# Patient Record
Sex: FEMALE | Race: BLACK OR AFRICAN AMERICAN | NOT HISPANIC OR LATINO | Employment: FULL TIME | URBAN - METROPOLITAN AREA
[De-identification: names, ages, dates, MRNs, and addresses within clinical notes are randomized per-mention and may not be internally consistent; named-entity substitution may affect disease eponyms.]

---

## 2017-04-17 DIAGNOSIS — J45.40: ICD-10-CM

## 2017-04-17 RX ORDER — BUDESONIDE AND FORMOTEROL FUMARATE DIHYDRATE 80; 4.5 UG/1; UG/1
2 AEROSOL RESPIRATORY (INHALATION) 2 TIMES DAILY
Qty: 10.2 INHALER | Refills: 4 | OUTPATIENT
Start: 2017-04-17 | End: 2018-04-17

## 2017-05-31 ENCOUNTER — LAB VISIT (OUTPATIENT)
Dept: LAB | Facility: OTHER | Age: 32
End: 2017-05-31
Attending: FAMILY MEDICINE
Payer: COMMERCIAL

## 2017-05-31 ENCOUNTER — TELEPHONE (OUTPATIENT)
Dept: INTERNAL MEDICINE | Facility: CLINIC | Age: 32
End: 2017-05-31

## 2017-05-31 ENCOUNTER — OFFICE VISIT (OUTPATIENT)
Dept: INTERNAL MEDICINE | Facility: CLINIC | Age: 32
End: 2017-05-31
Attending: FAMILY MEDICINE
Payer: COMMERCIAL

## 2017-05-31 VITALS
HEIGHT: 68 IN | BODY MASS INDEX: 39.59 KG/M2 | DIASTOLIC BLOOD PRESSURE: 80 MMHG | HEART RATE: 97 BPM | WEIGHT: 261.25 LBS | SYSTOLIC BLOOD PRESSURE: 120 MMHG

## 2017-05-31 DIAGNOSIS — L23.9 ECZEMA, ALLERGIC: ICD-10-CM

## 2017-05-31 DIAGNOSIS — Z00.00 ANNUAL PHYSICAL EXAM: Primary | ICD-10-CM

## 2017-05-31 DIAGNOSIS — F32.A DEPRESSION, UNSPECIFIED DEPRESSION TYPE: ICD-10-CM

## 2017-05-31 DIAGNOSIS — F41.9 ANXIETY: ICD-10-CM

## 2017-05-31 DIAGNOSIS — J45.30 ASTHMA, ALLERGIC, MILD PERSISTENT, UNCOMPLICATED: ICD-10-CM

## 2017-05-31 DIAGNOSIS — Z00.00 ANNUAL PHYSICAL EXAM: ICD-10-CM

## 2017-05-31 PROBLEM — J45.909 EXTRINSIC ASTHMA WITHOUT COMPLICATION: Status: ACTIVE | Noted: 2017-05-31

## 2017-05-31 LAB
ALBUMIN SERPL BCP-MCNC: 3.7 G/DL
ALP SERPL-CCNC: 67 U/L
ALT SERPL W/O P-5'-P-CCNC: 12 U/L
ANION GAP SERPL CALC-SCNC: 7 MMOL/L
AST SERPL-CCNC: 17 U/L
BASOPHILS # BLD AUTO: 0.05 K/UL
BASOPHILS NFR BLD: 0.7 %
BILIRUB SERPL-MCNC: 0.3 MG/DL
BUN SERPL-MCNC: 12 MG/DL
CALCIUM SERPL-MCNC: 9.6 MG/DL
CHLORIDE SERPL-SCNC: 106 MMOL/L
CHOLEST/HDLC SERPL: 3.6 {RATIO}
CO2 SERPL-SCNC: 25 MMOL/L
CREAT SERPL-MCNC: 0.8 MG/DL
DIFFERENTIAL METHOD: ABNORMAL
EOSINOPHIL # BLD AUTO: 0.3 K/UL
EOSINOPHIL NFR BLD: 4.8 %
ERYTHROCYTE [DISTWIDTH] IN BLOOD BY AUTOMATED COUNT: 15.4 %
EST. GFR  (AFRICAN AMERICAN): >60 ML/MIN/1.73 M^2
EST. GFR  (NON AFRICAN AMERICAN): >60 ML/MIN/1.73 M^2
GLUCOSE SERPL-MCNC: 81 MG/DL
HCT VFR BLD AUTO: 38.6 %
HDL/CHOLESTEROL RATIO: 27.6 %
HDLC SERPL-MCNC: 181 MG/DL
HDLC SERPL-MCNC: 50 MG/DL
HGB BLD-MCNC: 12.4 G/DL
LDLC SERPL CALC-MCNC: 117.8 MG/DL
LYMPHOCYTES # BLD AUTO: 1.9 K/UL
LYMPHOCYTES NFR BLD: 28.8 %
MCH RBC QN AUTO: 25.8 PG
MCHC RBC AUTO-ENTMCNC: 32.1 %
MCV RBC AUTO: 80 FL
MONOCYTES # BLD AUTO: 0.5 K/UL
MONOCYTES NFR BLD: 7 %
NEUTROPHILS # BLD AUTO: 3.9 K/UL
NEUTROPHILS NFR BLD: 58.6 %
NONHDLC SERPL-MCNC: 131 MG/DL
PLATELET # BLD AUTO: 400 K/UL
PMV BLD AUTO: 10.4 FL
POTASSIUM SERPL-SCNC: 4.1 MMOL/L
PROT SERPL-MCNC: 8 G/DL
RBC # BLD AUTO: 4.8 M/UL
SODIUM SERPL-SCNC: 138 MMOL/L
TRIGL SERPL-MCNC: 66 MG/DL
TSH SERPL DL<=0.005 MIU/L-ACNC: 1.41 UIU/ML
WBC # BLD AUTO: 6.7 K/UL

## 2017-05-31 PROCEDURE — 85025 COMPLETE CBC W/AUTO DIFF WBC: CPT

## 2017-05-31 PROCEDURE — 84443 ASSAY THYROID STIM HORMONE: CPT

## 2017-05-31 PROCEDURE — 80061 LIPID PANEL: CPT

## 2017-05-31 PROCEDURE — 99395 PREV VISIT EST AGE 18-39: CPT | Mod: S$GLB,,, | Performed by: FAMILY MEDICINE

## 2017-05-31 PROCEDURE — 80053 COMPREHEN METABOLIC PANEL: CPT

## 2017-05-31 PROCEDURE — 36415 COLL VENOUS BLD VENIPUNCTURE: CPT

## 2017-05-31 PROCEDURE — 99999 PR PBB SHADOW E&M-EST. PATIENT-LVL III: CPT | Mod: PBBFAC,,, | Performed by: FAMILY MEDICINE

## 2017-05-31 RX ORDER — BUDESONIDE AND FORMOTEROL FUMARATE DIHYDRATE 80; 4.5 UG/1; UG/1
2 AEROSOL RESPIRATORY (INHALATION) 2 TIMES DAILY
Qty: 10.2 INHALER | Refills: 4 | Status: SHIPPED | OUTPATIENT
Start: 2017-05-31 | End: 2017-05-31 | Stop reason: SDUPTHER

## 2017-05-31 RX ORDER — BUPROPION HYDROCHLORIDE 150 MG/1
TABLET ORAL
Qty: 30 TABLET | Refills: 1 | Status: SHIPPED | OUTPATIENT
Start: 2017-05-31 | End: 2018-01-09 | Stop reason: SDUPTHER

## 2017-05-31 RX ORDER — ALBUTEROL SULFATE 90 UG/1
2 AEROSOL, METERED RESPIRATORY (INHALATION) EVERY 6 HOURS PRN
Qty: 18 G | Refills: 5 | Status: SHIPPED | OUTPATIENT
Start: 2017-05-31 | End: 2017-05-31 | Stop reason: SDUPTHER

## 2017-05-31 RX ORDER — BUDESONIDE AND FORMOTEROL FUMARATE DIHYDRATE 80; 4.5 UG/1; UG/1
2 AEROSOL RESPIRATORY (INHALATION) 2 TIMES DAILY
Qty: 10.2 INHALER | Refills: 4 | Status: SHIPPED | OUTPATIENT
Start: 2017-05-31 | End: 2018-01-09

## 2017-05-31 RX ORDER — ALBUTEROL SULFATE 90 UG/1
2 AEROSOL, METERED RESPIRATORY (INHALATION) EVERY 6 HOURS PRN
Qty: 18 G | Refills: 5 | Status: SHIPPED | OUTPATIENT
Start: 2017-05-31 | End: 2018-01-09 | Stop reason: SDUPTHER

## 2017-05-31 NOTE — TELEPHONE ENCOUNTER
Spoke with the pt in regards to the message left . I was advising her that she can have her rx pulled from one store to another when the pt patient said she she was having a painc attack because her inhaler was over $200 and for me to address who I was and I said who I said who I was again and then she and the pt hung up. This message was relied to Dr. Lantigua.

## 2017-05-31 NOTE — TELEPHONE ENCOUNTER
----- Message from [place] sent at 5/31/2017  3:33 PM CDT -----  Contact: PAULINE HARDING [[id number]]  X_  1st Request  _  2nd Request  _  3rd Request        Who: PAULINE HARDING [[id number]]    Why: Patient called and said that her medication was called into the wrong pharmacy. Patient would like it to be sent [facility] [place] - [address] Please call back to follow up.    What Number to Call Back: [phone]    When to Expect a call back: (Before the end of the day)   -- if the call is after 12:00, the call back will be tomorrow.

## 2017-06-06 PROBLEM — L23.9 ECZEMA, ALLERGIC: Status: ACTIVE | Noted: 2017-06-06

## 2017-06-06 NOTE — PROGRESS NOTES
Subjective:       Patient ID: Yumiko Valerio is a 31 y.o. female.    Chief Complaint: Rash (rash on both feet, no pain just intching)    Pt presents today for annual exam. Pt also wants to know about family planning next year and wants to meet with a GYN prior. Pt also states that she has a rash on both feet but has an extensive h/o eczma as a child. States that asthma is not well controlled and is needing her alb inh more. Does not have daily maintanence med at this time        Rash   Pertinent negatives include no congestion, cough, diarrhea, eye pain, fatigue, fever, rhinorrhea, shortness of breath, sore throat or vomiting.     Review of Systems   Constitutional: Negative for activity change, appetite change, chills, fatigue, fever and unexpected weight change.   HENT: Negative for congestion, ear pain, hearing loss, rhinorrhea, sinus pressure, sore throat and trouble swallowing.    Eyes: Positive for discharge and visual disturbance. Negative for photophobia and pain.   Respiratory: Positive for chest tightness and wheezing. Negative for apnea, cough and shortness of breath.    Cardiovascular: Positive for palpitations. Negative for chest pain and leg swelling.   Gastrointestinal: Negative for abdominal distention, abdominal pain, blood in stool, constipation, diarrhea, nausea and vomiting.   Endocrine: Negative for polydipsia and polyuria.   Genitourinary: Negative.  Negative for difficulty urinating, dysuria, hematuria and menstrual problem.   Musculoskeletal: Negative for arthralgias, back pain, joint swelling and neck pain.   Skin: Positive for rash.   Neurological: Negative for dizziness, tremors, weakness, numbness and headaches.   Psychiatric/Behavioral: Positive for dysphoric mood. Negative for behavioral problems, confusion, decreased concentration, self-injury, sleep disturbance and suicidal ideas. The patient is not nervous/anxious.        Objective:      Physical Exam   Constitutional: She is  oriented to person, place, and time. She appears well-developed and well-nourished.   HENT:   Head: Normocephalic and atraumatic.   Right Ear: External ear normal.   Left Ear: External ear normal.   Nose: Nose normal.   Mouth/Throat: Oropharynx is clear and moist. No oropharyngeal exudate.   Eyes: EOM are normal. Pupils are equal, round, and reactive to light.   Neck: Normal range of motion. Neck supple. No thyromegaly present.   Cardiovascular: Normal rate, regular rhythm, normal heart sounds and intact distal pulses.    No murmur heard.  Pulmonary/Chest: Effort normal and breath sounds normal. No respiratory distress.   Abdominal: Soft. Bowel sounds are normal. She exhibits no distension and no mass. There is no tenderness. There is no rebound and no guarding.   Musculoskeletal: Normal range of motion. She exhibits no edema or tenderness.   Lymphadenopathy:     She has no cervical adenopathy.   Neurological: She is alert and oriented to person, place, and time. She has normal reflexes.   Skin: Skin is warm and dry. There is erythema (pos rash noted on b/l hands and feet, flexor surfaces indicative of eczema).   Psychiatric: She has a normal mood and affect. Her behavior is normal. Judgment and thought content normal.       Assessment:       1. Annual physical exam    2. Asthma, allergic, mild persistent, uncomplicated    3. Anxiety    4. Depression, unspecified depression type        Plan:       Would suggest baseline LABA for pt along corticosteroid. Explained that i do not know which is covered so pt will have to see if the one I order is not to call back w name of one that is. Pt amenable  Anxiety: pt will think about starting a long acting medicine for this  But if so will prefer wellbutrin which has worked well in past. Declines short acting at this time as well  Seasonal allergies that are elading to eczema and asthma. Steroid ointment for eczema   referred to GYN for family counseling, pt with female  partner and wants some understanding of this  Annual physical exam  -     Lipid panel; Future; Expected date: 05/31/2017  -     TSH; Future; Expected date: 05/31/2017  -     Comprehensive metabolic panel; Future; Expected date: 05/31/2017  -     CBC auto differential; Future; Expected date: 05/31/2017    Asthma, allergic, mild persistent, uncomplicated  -     Discontinue: albuterol 90 mcg/actuation inhaler; Inhale 2 puffs into the lungs every 6 (six) hours as needed for Wheezing.  Dispense: 18 g; Refill: 5  -     Discontinue: budesonide-formoterol 80-4.5 mcg (SYMBICORT) 80-4.5 mcg/actuation HFAA; Inhale 2 puffs into the lungs 2 (two) times daily.  Dispense: 10.2 Inhaler; Refill: 4  -     budesonide-formoterol 80-4.5 mcg (SYMBICORT) 80-4.5 mcg/actuation HFAA; Inhale 2 puffs into the lungs 2 (two) times daily.  Dispense: 10.2 Inhaler; Refill: 4  -     albuterol 90 mcg/actuation inhaler; Inhale 2 puffs into the lungs every 6 (six) hours as needed for Wheezing.  Dispense: 18 g; Refill: 5    Anxiety    Depression, unspecified depression type  -     buPROPion (WELLBUTRIN XL) 150 MG TB24 tablet; TAKE 1 TABLET(150 MG) BY MOUTH EVERY DAY  Dispense: 30 tablet; Refill: 1    Other orders  -     Cancel: VITAMIN D; Future; Expected date: 05/31/2017      Labs pending. RTC prn symptoms or 4 weeks for wellbutrin assessment

## 2018-01-09 ENCOUNTER — OFFICE VISIT (OUTPATIENT)
Dept: INTERNAL MEDICINE | Facility: CLINIC | Age: 33
End: 2018-01-09
Attending: FAMILY MEDICINE
Payer: COMMERCIAL

## 2018-01-09 VITALS
WEIGHT: 256.38 LBS | HEART RATE: 64 BPM | SYSTOLIC BLOOD PRESSURE: 134 MMHG | OXYGEN SATURATION: 99 % | DIASTOLIC BLOOD PRESSURE: 92 MMHG | BODY MASS INDEX: 38.98 KG/M2

## 2018-01-09 DIAGNOSIS — J45.30 ASTHMA, ALLERGIC, MILD PERSISTENT, UNCOMPLICATED: ICD-10-CM

## 2018-01-09 DIAGNOSIS — F17.200 SMOKER: ICD-10-CM

## 2018-01-09 DIAGNOSIS — R21 RASH: ICD-10-CM

## 2018-01-09 DIAGNOSIS — F32.A DEPRESSION, UNSPECIFIED DEPRESSION TYPE: Primary | ICD-10-CM

## 2018-01-09 PROCEDURE — 99214 OFFICE O/P EST MOD 30 MIN: CPT | Mod: S$GLB,,, | Performed by: FAMILY MEDICINE

## 2018-01-09 PROCEDURE — 99999 PR PBB SHADOW E&M-EST. PATIENT-LVL III: CPT | Mod: PBBFAC,,, | Performed by: FAMILY MEDICINE

## 2018-01-09 RX ORDER — BUPROPION HYDROCHLORIDE 150 MG/1
TABLET ORAL
Qty: 90 TABLET | Refills: 0 | Status: SHIPPED | OUTPATIENT
Start: 2018-01-09 | End: 2018-01-09 | Stop reason: SDUPTHER

## 2018-01-09 RX ORDER — BUPROPION HYDROCHLORIDE 150 MG/1
TABLET ORAL
Qty: 90 TABLET | Refills: 0 | Status: SHIPPED | OUTPATIENT
Start: 2018-01-09 | End: 2018-01-09

## 2018-01-09 RX ORDER — BUDESONIDE AND FORMOTEROL FUMARATE DIHYDRATE 160; 4.5 UG/1; UG/1
2 AEROSOL RESPIRATORY (INHALATION) EVERY 12 HOURS
Qty: 1 INHALER | Refills: 5 | Status: SHIPPED | OUTPATIENT
Start: 2018-01-09 | End: 2018-06-01 | Stop reason: SDUPTHER

## 2018-01-09 RX ORDER — BUPROPION HYDROCHLORIDE 300 MG/1
300 TABLET ORAL DAILY
Qty: 90 TABLET | Refills: 1 | Status: SHIPPED | OUTPATIENT
Start: 2018-01-09 | End: 2018-07-02 | Stop reason: SDUPTHER

## 2018-01-09 RX ORDER — ALBUTEROL SULFATE 90 UG/1
2 AEROSOL, METERED RESPIRATORY (INHALATION) EVERY 6 HOURS PRN
Qty: 18 G | Refills: 5 | Status: SHIPPED | OUTPATIENT
Start: 2018-01-09 | End: 2019-04-15 | Stop reason: SDUPTHER

## 2018-01-09 NOTE — PROGRESS NOTES
Subjective:       Patient ID: Yumiko Valerio is a 32 y.o. female.    Chief Complaint: Asthma; Nicotine Dependence (wants to discuss quiting); and Rash (bilateral foot ref derm)    Pt presents today for referral to GYN for persistent rash on both feet but has an extensive h/o eczma as a child. States that asthma is not well controlled and is needing her alb inh more. Does have daily med but on lower dosing at this time  Pt also wants to quit smoking, willing to look into smoking cessation as well as resuming wellbutrin  Pt also with anxiety and would like a med for this as well        Rash   Pertinent negatives include no congestion, cough, diarrhea, eye pain, fatigue, fever, rhinorrhea, shortness of breath, sore throat or vomiting. Her past medical history is significant for asthma.   Asthma   She complains of wheezing. There is no cough or shortness of breath. Pertinent negatives include no appetite change, chest pain, ear pain, fever, headaches, rhinorrhea, sore throat or trouble swallowing. Her past medical history is significant for asthma.   Nicotine Dependence   Symptoms are negative for decreased concentration, fatigue and sore throat. Her urge triggers include company of smokers.     Review of Systems   Constitutional: Negative for activity change, appetite change, chills, fatigue, fever and unexpected weight change.   HENT: Negative for congestion, ear pain, hearing loss, rhinorrhea, sinus pressure, sore throat and trouble swallowing.    Eyes: Positive for discharge and visual disturbance. Negative for photophobia and pain.   Respiratory: Positive for chest tightness and wheezing. Negative for apnea, cough and shortness of breath.    Cardiovascular: Positive for palpitations. Negative for chest pain and leg swelling.   Gastrointestinal: Negative for abdominal distention, abdominal pain, blood in stool, constipation, diarrhea, nausea and vomiting.   Endocrine: Negative for polydipsia and polyuria.    Genitourinary: Negative.  Negative for difficulty urinating, dysuria, hematuria and menstrual problem.   Musculoskeletal: Negative for arthralgias, back pain, joint swelling and neck pain.   Skin: Positive for rash.   Neurological: Negative for dizziness, tremors, weakness, numbness and headaches.   Psychiatric/Behavioral: Positive for dysphoric mood. Negative for behavioral problems, confusion, decreased concentration, self-injury, sleep disturbance and suicidal ideas. The patient is nervous/anxious.        Objective:      Physical Exam   Constitutional: She is oriented to person, place, and time. She appears well-developed and well-nourished.   HENT:   Head: Normocephalic and atraumatic.   Right Ear: External ear normal.   Left Ear: External ear normal.   Nose: Nose normal.   Mouth/Throat: Oropharynx is clear and moist. No oropharyngeal exudate.   Eyes: EOM are normal. Pupils are equal, round, and reactive to light.   Neck: Normal range of motion. Neck supple. No thyromegaly present.   Cardiovascular: Normal rate, regular rhythm, normal heart sounds and intact distal pulses.    No murmur heard.  Pulmonary/Chest: Effort normal and breath sounds normal. No respiratory distress.   Abdominal: Soft. Bowel sounds are normal. She exhibits no distension and no mass. There is no tenderness. There is no rebound and no guarding.   Musculoskeletal: Normal range of motion. She exhibits no edema or tenderness.   Lymphadenopathy:     She has no cervical adenopathy.   Neurological: She is alert and oriented to person, place, and time. She has normal reflexes.   Skin: Skin is warm and dry. Rash noted. There is erythema (pos rash noted on b/l hands and feet, flexor surfaces indicative of eczema).   Psychiatric: She has a normal mood and affect. Her behavior is normal. Judgment and thought content normal.       Assessment:       1. Rash    2. Smoker    3. Asthma, allergic, mild persistent, uncomplicated    4. Depression,  unspecified depression type        Plan:       Would suggest increasing her baseline LABA for pt along corticosteroid. Pt amenable  Anxiety:  prefer wellbutrin which has worked well in past. Declines short acting at this time as well. Resume wellbutrin but pt wants 300 mg dosing. SE of med d.w pt  Referral to derm for persistent rash     Rash  -     Ambulatory Referral to Dermatology    Smoker  -     Ambulatory referral to Smoking Cessation Program    Asthma, allergic, mild persistent, uncomplicated  -     albuterol 90 mcg/actuation inhaler; Inhale 2 puffs into the lungs every 6 (six) hours as needed for Wheezing.  Dispense: 18 g; Refill: 5    Depression, unspecified depression type  -     Discontinue: buPROPion (WELLBUTRIN XL) 150 MG TB24 tablet; TAKE 1 TABLET(150 MG) BY MOUTH EVERY DAY  Dispense: 90 tablet; Refill: 0  -     Discontinue: buPROPion (WELLBUTRIN XL) 150 MG TB24 tablet; TAKE 1 TABLET(150 MG) BY MOUTH EVERY DAY  Dispense: 90 tablet; Refill: 0    Other orders  -     budesonide-formoterol 160-4.5 mcg (SYMBICORT) 160-4.5 mcg/actuation HFAA; Inhale 2 puffs into the lungs every 12 (twelve) hours. Controller  Dispense: 1 Inhaler; Refill: 5  -     buPROPion (WELLBUTRIN XL) 300 MG 24 hr tablet; Take 1 tablet (300 mg total) by mouth once daily.  Dispense: 90 tablet; Refill: 1      Labs pending. RTC prn symptoms or 6 mos for wellbutrin assessment  SE's of med d/w pt denies any SI/HI

## 2018-01-10 ENCOUNTER — TELEPHONE (OUTPATIENT)
Dept: INTERNAL MEDICINE | Facility: CLINIC | Age: 33
End: 2018-01-10

## 2018-01-11 NOTE — TELEPHONE ENCOUNTER
Spoke with pt. Pt states she will ruddy derm appt via patient portal. Pt verbally understood and had no further questions.

## 2018-02-09 ENCOUNTER — OFFICE VISIT (OUTPATIENT)
Dept: OBSTETRICS AND GYNECOLOGY | Facility: CLINIC | Age: 33
End: 2018-02-09
Payer: COMMERCIAL

## 2018-02-09 VITALS
WEIGHT: 251.31 LBS | SYSTOLIC BLOOD PRESSURE: 120 MMHG | DIASTOLIC BLOOD PRESSURE: 72 MMHG | HEIGHT: 68 IN | BODY MASS INDEX: 38.09 KG/M2

## 2018-02-09 DIAGNOSIS — F41.9 ANXIETY: ICD-10-CM

## 2018-02-09 DIAGNOSIS — Z11.3 SCREEN FOR STD (SEXUALLY TRANSMITTED DISEASE): ICD-10-CM

## 2018-02-09 DIAGNOSIS — Z01.419 ENCOUNTER FOR GYNECOLOGICAL EXAMINATION WITHOUT ABNORMAL FINDING: Primary | ICD-10-CM

## 2018-02-09 LAB
C TRACH DNA SPEC QL NAA+PROBE: NOT DETECTED
CANDIDA RRNA VAG QL PROBE: NEGATIVE
G VAGINALIS RRNA GENITAL QL PROBE: NEGATIVE
N GONORRHOEA DNA SPEC QL NAA+PROBE: NOT DETECTED
T VAGINALIS RRNA GENITAL QL PROBE: NEGATIVE

## 2018-02-09 PROCEDURE — 99999 PR PBB SHADOW E&M-EST. PATIENT-LVL III: CPT | Mod: PBBFAC,,, | Performed by: OBSTETRICS & GYNECOLOGY

## 2018-02-09 PROCEDURE — 87480 CANDIDA DNA DIR PROBE: CPT

## 2018-02-09 PROCEDURE — 87491 CHLMYD TRACH DNA AMP PROBE: CPT

## 2018-02-09 PROCEDURE — 99395 PREV VISIT EST AGE 18-39: CPT | Mod: S$GLB,,, | Performed by: OBSTETRICS & GYNECOLOGY

## 2018-02-09 PROCEDURE — 88175 CYTOPATH C/V AUTO FLUID REDO: CPT

## 2018-02-19 NOTE — PROGRESS NOTES
HISTORY OF PRESENT ILLNESS:    Yumiko Valerio is a 32 y.o. female, , Patient's last menstrual period was 2018.,  presents for a routine exam and has no complaints.  Patient desires pregnancy in the future, seen with partner today. Normal monthly cycles.     Past Medical History:   Diagnosis Date    Allergy     Anemia     Asthma     Depression        Past Surgical History:   Procedure Laterality Date    WISDOM TOOTH EXTRACTION      WISDOM TOOTH EXTRACTION         MEDICATIONS AND ALLERGIES:      Current Outpatient Prescriptions:     albuterol 90 mcg/actuation inhaler, Inhale 2 puffs into the lungs every 6 (six) hours as needed for Wheezing., Disp: 18 g, Rfl: 5    budesonide-formoterol 160-4.5 mcg (SYMBICORT) 160-4.5 mcg/actuation HFAA, Inhale 2 puffs into the lungs every 12 (twelve) hours. Controller, Disp: 1 Inhaler, Rfl: 5    buPROPion (WELLBUTRIN XL) 300 MG 24 hr tablet, Take 1 tablet (300 mg total) by mouth once daily., Disp: 90 tablet, Rfl: 1    cyclobenzaprine (FLEXERIL) 5 MG tablet, Take 5 mg by mouth 3 (three) times daily as needed., Disp: , Rfl:     Review of patient's allergies indicates:   Allergen Reactions    Hay fever and allergy relief Shortness Of Breath     Watery and itchey eyes       Family History   Problem Relation Age of Onset    Breast cancer Maternal Aunt     Colon cancer Neg Hx     Ovarian cancer Neg Hx        Social History     Social History    Marital status:      Spouse name: N/A    Number of children: N/A    Years of education: N/A     Occupational History    Not on file.     Social History Main Topics    Smoking status: Former Smoker    Smokeless tobacco: Never Used    Alcohol use 1.2 oz/week     2 Glasses of wine per week    Drug use: No    Sexual activity: Yes     Partners: Female     Other Topics Concern    Not on file     Social History Narrative    No narrative on file       COMPREHENSIVE GYN HISTORY:  PAP History: Denies abnormal  "Paps.  Infection History: Denies STDs. Denies PID.  Benign History: Denies uterine fibroids. Denies ovarian cysts. Denies endometriosis. Denies other conditions.  Cancer History: Denies cervical cancer. Denies uterine cancer or hyperplasia. Denies ovarian cancer. Denies vulvar cancer or pre-cancer. Denies vaginal cancer or pre-cancer. Denies breast cancer. Denies colon cancer.  Sexual Activity History: Reports currently being sexually active  Menstrual History: Monthly. Mod then light flow.   Dysmenorrhea History: Reports mild dysmenorrhea.       ROS:  GENERAL: No weight changes. No swelling. No fatigue. No fever.  CARDIOVASCULAR: No chest pain. No shortness of breath. No leg cramps.   NEUROLOGICAL: No headaches. No vision changes.  BREASTS: No pain. No lumps. No discharge.  ABDOMEN: No pain. No nausea. No vomiting. No diarrhea. No constipation.  REPRODUCTIVE: No abnormal bleeding.   VULVA: No pain. No lesions. No itching.  VAGINA: No relaxation. No itching. No odor. No discharge. No lesions.  URINARY: No incontinence. No nocturia. No frequency. No dysuria.    /72   Ht 5' 8" (1.727 m)   Wt 114 kg (251 lb 5.2 oz)   LMP 02/01/2018   BMI 38.21 kg/m²     PE:  APPEARANCE: Well nourished, well developed, in no acute distress.  AFFECT: WNL, alert and oriented x 3.  SKIN: No acne or hirsutism.  NECK: Neck symmetric, without masses or thyromegaly.  NODES: No inguinal, cervical, axillary or femoral lymph node enlargement.  CHEST: Good respiratory effort.   ABDOMEN: Soft. No tenderness or masses. No hepatosplenomegaly. No hernias.  BREASTS: Symmetrical, no skin changes, visible lesions, palpable masses or nipple discharge bilaterally.  PELVIC: External female genitalia without lesions.  Female hair distribution. Adequate perineal body, Normal urethral meatus. Vagina moist and well rugated without lesions or discharge.  No significant cystocele or rectocele present. Cervix pink without lesions, discharge or " tenderness. Uterus is 4-6 week size, regular, mobile and nontender. Adnexa without masses or tenderness.  EXTREMITIES: No edema    DIAGNOSIS:  1. Encounter for gynecological examination without abnormal finding    2. Anxiety    3. Screen for STD (sexually transmitted disease)        PLAN:    Orders Placed This Encounter    C. trachomatis/N. gonorrhoeae by AMP DNA Cervix    Vaginosis Screen by DNA Probe    Liquid-based pap smear, screening       COUNSELING:  The patient was counseled today on:  -A.C.S. Pap and pelvic exam guidelines (pap every 3 years), recomendations for yearly mammogram;  -to follow up with her PCP for other health maintenance.    FOLLOW-UP with me annually.   Patient to follow up with Dr. Ann for evaluation.

## 2018-06-01 RX ORDER — BUDESONIDE AND FORMOTEROL FUMARATE DIHYDRATE 160; 4.5 UG/1; UG/1
AEROSOL RESPIRATORY (INHALATION)
Qty: 10.2 INHALER | Refills: 2 | Status: SHIPPED | OUTPATIENT
Start: 2018-06-01 | End: 2019-04-15 | Stop reason: SDUPTHER

## 2018-07-02 RX ORDER — BUPROPION HYDROCHLORIDE 300 MG/1
TABLET ORAL
Qty: 90 TABLET | Refills: 1 | Status: SHIPPED | OUTPATIENT
Start: 2018-07-02 | End: 2019-01-03 | Stop reason: SDUPTHER

## 2018-09-13 ENCOUNTER — INITIAL PRENATAL (OUTPATIENT)
Dept: OBSTETRICS AND GYNECOLOGY | Facility: CLINIC | Age: 33
End: 2018-09-13
Payer: COMMERCIAL

## 2018-09-13 VITALS — DIASTOLIC BLOOD PRESSURE: 70 MMHG | WEIGHT: 249 LBS | BODY MASS INDEX: 37.86 KG/M2 | SYSTOLIC BLOOD PRESSURE: 120 MMHG

## 2018-09-13 DIAGNOSIS — Z34.02 ENCOUNTER FOR SUPERVISION OF NORMAL FIRST PREGNANCY IN SECOND TRIMESTER: Primary | ICD-10-CM

## 2018-09-13 PROCEDURE — 99999 PR PBB SHADOW E&M-EST. PATIENT-LVL II: CPT | Mod: PBBFAC,,, | Performed by: OBSTETRICS & GYNECOLOGY

## 2018-09-13 PROCEDURE — 87086 URINE CULTURE/COLONY COUNT: CPT

## 2018-09-13 PROCEDURE — 0502F SUBSEQUENT PRENATAL CARE: CPT | Mod: S$GLB,,, | Performed by: OBSTETRICS & GYNECOLOGY

## 2018-09-14 LAB — BACTERIA UR CULT: NO GROWTH

## 2018-10-10 ENCOUNTER — ROUTINE PRENATAL (OUTPATIENT)
Dept: OBSTETRICS AND GYNECOLOGY | Facility: CLINIC | Age: 33
End: 2018-10-10
Payer: COMMERCIAL

## 2018-10-10 ENCOUNTER — LAB VISIT (OUTPATIENT)
Dept: LAB | Facility: OTHER | Age: 33
End: 2018-10-10
Attending: OBSTETRICS & GYNECOLOGY
Payer: COMMERCIAL

## 2018-10-10 VITALS
SYSTOLIC BLOOD PRESSURE: 110 MMHG | WEIGHT: 251.31 LBS | DIASTOLIC BLOOD PRESSURE: 70 MMHG | BODY MASS INDEX: 38.21 KG/M2

## 2018-10-10 DIAGNOSIS — O09.92 SUPERVISION OF HIGH-RISK PREGNANCY, SECOND TRIMESTER: Primary | ICD-10-CM

## 2018-10-10 DIAGNOSIS — Z34.02 ENCOUNTER FOR SUPERVISION OF NORMAL FIRST PREGNANCY IN SECOND TRIMESTER: ICD-10-CM

## 2018-10-10 LAB — GLUCOSE SERPL-MCNC: 134 MG/DL

## 2018-10-10 PROCEDURE — 82950 GLUCOSE TEST: CPT

## 2018-10-10 PROCEDURE — 36415 COLL VENOUS BLD VENIPUNCTURE: CPT

## 2018-10-10 PROCEDURE — 99999 PR PBB SHADOW E&M-EST. PATIENT-LVL II: CPT | Mod: PBBFAC,,, | Performed by: OBSTETRICS & GYNECOLOGY

## 2018-10-10 PROCEDURE — 0502F SUBSEQUENT PRENATAL CARE: CPT | Mod: S$GLB,,, | Performed by: OBSTETRICS & GYNECOLOGY

## 2018-10-10 NOTE — PROGRESS NOTES
Patient without complaints. Considering delivery home vs. Hospital & ABC vs L&D.   No abdominal pain or VB.

## 2018-10-29 ENCOUNTER — PROCEDURE VISIT (OUTPATIENT)
Dept: MATERNAL FETAL MEDICINE | Facility: CLINIC | Age: 33
End: 2018-10-29
Payer: COMMERCIAL

## 2018-10-29 DIAGNOSIS — O09.92 SUPERVISION OF HIGH-RISK PREGNANCY, SECOND TRIMESTER: ICD-10-CM

## 2018-10-29 DIAGNOSIS — Z36.89 ENCOUNTER FOR ULTRASOUND TO CHECK FETAL GROWTH: Primary | ICD-10-CM

## 2018-10-29 PROCEDURE — 99499 UNLISTED E&M SERVICE: CPT | Mod: S$GLB,,, | Performed by: PEDIATRICS

## 2018-10-29 PROCEDURE — 76805 OB US >/= 14 WKS SNGL FETUS: CPT | Mod: S$GLB,,, | Performed by: PEDIATRICS

## 2018-11-07 ENCOUNTER — ROUTINE PRENATAL (OUTPATIENT)
Dept: OBSTETRICS AND GYNECOLOGY | Facility: CLINIC | Age: 33
End: 2018-11-07
Payer: COMMERCIAL

## 2018-11-07 VITALS
SYSTOLIC BLOOD PRESSURE: 114 MMHG | WEIGHT: 251.31 LBS | BODY MASS INDEX: 38.21 KG/M2 | DIASTOLIC BLOOD PRESSURE: 72 MMHG

## 2018-11-07 DIAGNOSIS — Z34.02 ENCOUNTER FOR SUPERVISION OF NORMAL FIRST PREGNANCY IN SECOND TRIMESTER: Primary | ICD-10-CM

## 2018-11-07 PROCEDURE — 0502F SUBSEQUENT PRENATAL CARE: CPT | Mod: S$GLB,,, | Performed by: OBSTETRICS & GYNECOLOGY

## 2018-11-07 PROCEDURE — 99999 PR PBB SHADOW E&M-EST. PATIENT-LVL II: CPT | Mod: PBBFAC,,, | Performed by: OBSTETRICS & GYNECOLOGY

## 2018-11-07 NOTE — PROGRESS NOTES
Discussed US. Patient considering delivery in the hospital. Some anxiety about delivery in the hospital,. Discussed in detail. Patient reports good fm, no vaginal bleeding, contractions or rupture of membranes. Overall doing well. Labs reviewed. Questions answered today. Encouraged prenatal classes and lactation visits.

## 2018-11-23 ENCOUNTER — PROCEDURE VISIT (OUTPATIENT)
Dept: MATERNAL FETAL MEDICINE | Facility: CLINIC | Age: 33
End: 2018-11-23
Payer: COMMERCIAL

## 2018-11-23 DIAGNOSIS — Z36.89 ENCOUNTER FOR ULTRASOUND TO CHECK FETAL GROWTH: ICD-10-CM

## 2018-11-23 PROCEDURE — 99499 UNLISTED E&M SERVICE: CPT | Mod: S$GLB,,, | Performed by: OBSTETRICS & GYNECOLOGY

## 2018-11-23 PROCEDURE — 76816 OB US FOLLOW-UP PER FETUS: CPT | Mod: S$GLB,,, | Performed by: OBSTETRICS & GYNECOLOGY

## 2018-12-05 ENCOUNTER — ROUTINE PRENATAL (OUTPATIENT)
Dept: OBSTETRICS AND GYNECOLOGY | Facility: CLINIC | Age: 33
End: 2018-12-05
Payer: COMMERCIAL

## 2018-12-05 VITALS
WEIGHT: 251.31 LBS | SYSTOLIC BLOOD PRESSURE: 110 MMHG | DIASTOLIC BLOOD PRESSURE: 70 MMHG | BODY MASS INDEX: 38.21 KG/M2

## 2018-12-05 DIAGNOSIS — J45.909 EXTRINSIC ASTHMA WITHOUT COMPLICATION, UNSPECIFIED ASTHMA SEVERITY, UNSPECIFIED WHETHER PERSISTENT: ICD-10-CM

## 2018-12-05 DIAGNOSIS — F41.9 ANXIETY: Primary | ICD-10-CM

## 2018-12-05 DIAGNOSIS — Z34.02 ENCOUNTER FOR SUPERVISION OF NORMAL FIRST PREGNANCY IN SECOND TRIMESTER: ICD-10-CM

## 2018-12-05 PROCEDURE — 0502F SUBSEQUENT PRENATAL CARE: CPT | Mod: S$GLB,,, | Performed by: OBSTETRICS & GYNECOLOGY

## 2018-12-05 PROCEDURE — 99999 PR PBB SHADOW E&M-EST. PATIENT-LVL II: CPT | Mod: PBBFAC,,, | Performed by: OBSTETRICS & GYNECOLOGY

## 2018-12-05 RX ORDER — TETANUS TOXOID, REDUCED DIPHTHERIA TOXOID AND ACELLULAR PERTUSSIS VACCINE, ADSORBED 5; 2.5; 8; 8; 2.5 [IU]/.5ML; [IU]/.5ML; UG/.5ML; UG/.5ML; UG/.5ML
SUSPENSION INTRAMUSCULAR
Status: ON HOLD | COMMUNITY
Start: 2018-10-14 | End: 2019-03-26 | Stop reason: HOSPADM

## 2018-12-05 NOTE — PROGRESS NOTES
Seen with mother today, Doing well.   Patient reports good fm, no vaginal bleeding, contractions or rupture of membranes. Overall doing well. Labs reviewed. Questions answered today. Encouraged prenatal classes and lactation visits. Labor, ROM and VB precautions given.

## 2018-12-31 ENCOUNTER — LAB VISIT (OUTPATIENT)
Dept: LAB | Facility: OTHER | Age: 33
End: 2018-12-31
Attending: OBSTETRICS & GYNECOLOGY
Payer: COMMERCIAL

## 2018-12-31 ENCOUNTER — ROUTINE PRENATAL (OUTPATIENT)
Dept: OBSTETRICS AND GYNECOLOGY | Facility: CLINIC | Age: 33
End: 2018-12-31
Payer: COMMERCIAL

## 2018-12-31 VITALS
WEIGHT: 254.88 LBS | DIASTOLIC BLOOD PRESSURE: 80 MMHG | BODY MASS INDEX: 38.75 KG/M2 | SYSTOLIC BLOOD PRESSURE: 112 MMHG

## 2018-12-31 DIAGNOSIS — Z34.03 ENCOUNTER FOR SUPERVISION OF NORMAL FIRST PREGNANCY IN THIRD TRIMESTER: Primary | ICD-10-CM

## 2018-12-31 DIAGNOSIS — O09.92 SUPERVISION OF HIGH-RISK PREGNANCY, SECOND TRIMESTER: ICD-10-CM

## 2018-12-31 LAB
ABO + RH BLD: NORMAL
BASOPHILS # BLD AUTO: 0.02 K/UL
BASOPHILS NFR BLD: 0.2 %
BLD GP AB SCN CELLS X3 SERPL QL: NORMAL
DIFFERENTIAL METHOD: ABNORMAL
EOSINOPHIL # BLD AUTO: 0.3 K/UL
EOSINOPHIL NFR BLD: 2.7 %
ERYTHROCYTE [DISTWIDTH] IN BLOOD BY AUTOMATED COUNT: 14.3 %
HCT VFR BLD AUTO: 33.8 %
HGB BLD-MCNC: 11.2 G/DL
LYMPHOCYTES # BLD AUTO: 1.8 K/UL
LYMPHOCYTES NFR BLD: 19.2 %
MCH RBC QN AUTO: 27.7 PG
MCHC RBC AUTO-ENTMCNC: 33.1 G/DL
MCV RBC AUTO: 84 FL
MONOCYTES # BLD AUTO: 0.7 K/UL
MONOCYTES NFR BLD: 7.6 %
NEUTROPHILS # BLD AUTO: 6.3 K/UL
NEUTROPHILS NFR BLD: 69.3 %
PLATELET # BLD AUTO: 350 K/UL
PMV BLD AUTO: 10.9 FL
RBC # BLD AUTO: 4.04 M/UL
WBC # BLD AUTO: 9.11 K/UL

## 2018-12-31 PROCEDURE — 0502F SUBSEQUENT PRENATAL CARE: CPT | Mod: S$GLB,,, | Performed by: OBSTETRICS & GYNECOLOGY

## 2018-12-31 PROCEDURE — 85025 COMPLETE CBC W/AUTO DIFF WBC: CPT

## 2018-12-31 PROCEDURE — 86850 RBC ANTIBODY SCREEN: CPT

## 2018-12-31 PROCEDURE — 81220 CFTR GENE COM VARIANTS: CPT

## 2018-12-31 PROCEDURE — 83020 HEMOGLOBIN ELECTROPHORESIS: CPT

## 2018-12-31 PROCEDURE — 99999 PR PBB SHADOW E&M-EST. PATIENT-LVL II: CPT | Mod: PBBFAC,,, | Performed by: OBSTETRICS & GYNECOLOGY

## 2019-01-01 NOTE — PROGRESS NOTES
"Indication  ========    Fetal anatomy survey.    History  ======    Risk Factors  Details: IUI  History risk factors: Obesity  Details: BMI = 38    Pregnancy History  ==============    Maternal Lab Tests  Genetic screening declined.      Method  ======    Transabdominal ultrasound examination, [place]on E10. View: Good view.    Pregnancy  =========    Neumann pregnancy. Number of fetuses: 1.    Dating  ======    Cycle: regular cycle  GA by "stated dating" 20 w + 1 d  GERMAN by "stated dating": 3/17/2019  "Stated dating" by: IUI procedure date  Ultrasound examination on: 10/29/2018  GA by U/S based upon: AC, BPD, Femur, HC  GA by U/S 20 w + 6 d  GERMAN by U/S: 3/12/2019  Assigned: Dating performed on 10/29/2018, based on the external assessment (by IUI procedure date )  Assigned GA 20 w + 1 d  Assigned GERMAN: 3/17/2019    General Evaluation  ==============    Cardiac activity: present.  bpm.  Fetal movements: visualized.  Presentation: variable lie.  Placenta:  Placental site: posterior. Distance from internal cervical os 42 mm.  Umbilical cord: Cord vessels: 3 vessel cord, normal insertion.  Amniotic fluid: normal amount.    Fetal Biometry  ============    Fetal Biometry  BPD 48.8 mm 20w 5d Hadlock  OFD 63.3 mm 21w 4d Asaf  .9 mm 20w 4d Hadlock  .4 mm 21w 4d Hadlock  Femur 34.4 mm 20w 6d Hadlock  Cerebellum tr 21.2 mm 20w 6d Campos  CM 2.3 mm  Nuchal fold 4.24 mm   g 45% Yazan  Calculated by: Hadlock (BPD-HC-AC-FL)  EFW (lb) 0 lb  EFW (oz) 14 oz  Cephalic index 0.77  HC / AC 1.09  FL / BPD 0.70  FL / AC 0.21   bpm  Head / Face / Neck   5.2 mm  Nasal bone 8.0 mm    Fetal Anatomy  ============    Cranium: normal  Lateral ventricles: normal  Choroid plexus: normal  Midline falx: normal  Cavum septi pellucidi: normal  Cerebellum: normal  Cisterna magna: normal  Head shape: normal  Rt lateral ventricle: normal  Lt lateral ventricle: normal  Rt choroid plexus: normal  Lt choroid " plexus: normal  Parenchyma: normal  Third ventricle: normal  Posterior fossa: normal  Cerebellar lobes: normal  Vermis: normal  Neck: appears normal  Nuchal fold: normal  Lips: normal  Profile: normal  Nose: normal  Maxilla: normal  Mandible: normal  4-chamber view: normal  RVOT: normal  LVOT: normal  Heart / Thorax: Septal views: normal  Situs: normal  Aortic arch: suboptimal  Ductal arch: normal  SVC: suboptimal  IVC: suboptimal  3-vessel view: suboptimal  3-vessel-trachea view: suboptimal  Cardiac position: normal  Cardiac axis: normal  Cardiac size: normal  Cardiac rhythm: normal  Rt lung: normal  Lt lung: normal  Diaphragm: normal  Cord insertion: normal  Stomach: normal  Kidneys: normal  Bladder: normal  Genitals: normal  Abdom. wall: appears normal  Abdom. cavity: normal  Rt kidney: normal  Lt kidney: normal  Liver: normal  Bowel: normal  Cervical spine: suboptimal  Thoracic spine: suboptimal  Lumbar spine: suboptimal  Sacral spine: suboptimal  Arms: both visualized  Legs: both visualized  Rt arm: normal  Lt arm: normal  Rt hand: present  Lt hand: present  Rt leg: normal  Lt leg: normal  Rt foot: normal  Lt foot: normal  Position of hands: normal  Position of feet: normal  Gender: female  Wants to know gender: yes    Maternal Structures  ===============    Uterus / Cervix  Approach: Transabdominal  Cervical length 45.0 mm  Ovaries / Tubes / Adnexa  Rt ovary: Not visualized  Lt ovary: Not visualized          Impression  =========    A hartmann living IUP is identified.    Fetal size is appropriate for established dating.  No fetal structural malformations are identified; however, the anatomic survey is incomplete as noted above. The patient will be scheduled for a  f/u exam to complete the anatomic survey.    Cervical length is normal, and placental location is normal without evidence of previa.              Recommendation  ==============    We recommend evaluation in 4 weeks for completion of anatomy and  growth.      Thank you again for allowing us to participate in the care of your patients. If you have any questions concerning today's consultation, feel free  to contact me or one of my partners. We can be reached at [phone] during normal business hours. If you have a question after normal  business hours, please contact Labor and Delivery at [phone].     Non-intractable vomiting without nausea, unspecified vomiting type

## 2019-01-01 NOTE — PROGRESS NOTES
Patient reports good fm, no vaginal bleeding, contractions or rupture of membranes. Overall doing well. Labs reviewed. Questions answered today. S/p prenatal classes. Labor, ROM and VB precautions given. Seen with spouse today. Discussed birth plan, she will bring at the next visit.

## 2019-01-02 LAB
HGB A2 MFR BLD HPLC: 2.4 %
HGB FRACT BLD ELPH-IMP: NORMAL
HGB FRACT BLD ELPH-IMP: NORMAL

## 2019-01-03 RX ORDER — BUPROPION HYDROCHLORIDE 300 MG/1
TABLET ORAL
Qty: 30 TABLET | Refills: 0 | Status: SHIPPED | OUTPATIENT
Start: 2019-01-03 | End: 2019-04-15 | Stop reason: SDUPTHER

## 2019-01-04 ENCOUNTER — PATIENT MESSAGE (OUTPATIENT)
Dept: PRIMARY CARE CLINIC | Facility: CLINIC | Age: 34
End: 2019-01-04

## 2019-01-04 LAB — CFTR MUT ANL BLD/T: NORMAL

## 2019-01-15 ENCOUNTER — ROUTINE PRENATAL (OUTPATIENT)
Dept: OBSTETRICS AND GYNECOLOGY | Facility: CLINIC | Age: 34
End: 2019-01-15
Payer: COMMERCIAL

## 2019-01-15 ENCOUNTER — LAB VISIT (OUTPATIENT)
Dept: LAB | Facility: OTHER | Age: 34
End: 2019-01-15
Attending: OBSTETRICS & GYNECOLOGY
Payer: COMMERCIAL

## 2019-01-15 VITALS
DIASTOLIC BLOOD PRESSURE: 76 MMHG | SYSTOLIC BLOOD PRESSURE: 102 MMHG | WEIGHT: 256.81 LBS | BODY MASS INDEX: 39.05 KG/M2

## 2019-01-15 DIAGNOSIS — Z34.03 ENCOUNTER FOR SUPERVISION OF NORMAL FIRST PREGNANCY IN THIRD TRIMESTER: Primary | ICD-10-CM

## 2019-01-15 DIAGNOSIS — Z34.02 ENCOUNTER FOR SUPERVISION OF NORMAL FIRST PREGNANCY IN SECOND TRIMESTER: ICD-10-CM

## 2019-01-15 LAB
ANION GAP SERPL CALC-SCNC: 9 MMOL/L
BUN SERPL-MCNC: 9 MG/DL
CALCIUM SERPL-MCNC: 9.3 MG/DL
CHLORIDE SERPL-SCNC: 104 MMOL/L
CO2 SERPL-SCNC: 21 MMOL/L
CREAT SERPL-MCNC: 0.7 MG/DL
EST. GFR  (AFRICAN AMERICAN): >60 ML/MIN/1.73 M^2
EST. GFR  (NON AFRICAN AMERICAN): >60 ML/MIN/1.73 M^2
GLUCOSE SERPL-MCNC: 68 MG/DL
POTASSIUM SERPL-SCNC: 4.1 MMOL/L
SODIUM SERPL-SCNC: 134 MMOL/L
TSH SERPL DL<=0.005 MIU/L-ACNC: 1.99 UIU/ML

## 2019-01-15 PROCEDURE — 0502F SUBSEQUENT PRENATAL CARE: CPT | Mod: S$GLB,,, | Performed by: OBSTETRICS & GYNECOLOGY

## 2019-01-15 PROCEDURE — 86592 SYPHILIS TEST NON-TREP QUAL: CPT

## 2019-01-15 PROCEDURE — 99999 PR PBB SHADOW E&M-EST. PATIENT-LVL II: CPT | Mod: PBBFAC,,, | Performed by: OBSTETRICS & GYNECOLOGY

## 2019-01-15 PROCEDURE — 36415 COLL VENOUS BLD VENIPUNCTURE: CPT

## 2019-01-15 PROCEDURE — 0502F PR SUBSEQUENT PRENATAL CARE: ICD-10-PCS | Mod: S$GLB,,, | Performed by: OBSTETRICS & GYNECOLOGY

## 2019-01-15 PROCEDURE — 86703 HIV-1/HIV-2 1 RESULT ANTBDY: CPT

## 2019-01-15 PROCEDURE — 99999 PR PBB SHADOW E&M-EST. PATIENT-LVL II: ICD-10-PCS | Mod: PBBFAC,,, | Performed by: OBSTETRICS & GYNECOLOGY

## 2019-01-15 PROCEDURE — 87340 HEPATITIS B SURFACE AG IA: CPT

## 2019-01-15 PROCEDURE — 80048 BASIC METABOLIC PNL TOTAL CA: CPT

## 2019-01-15 PROCEDURE — 84443 ASSAY THYROID STIM HORMONE: CPT

## 2019-01-15 PROCEDURE — 86762 RUBELLA ANTIBODY: CPT

## 2019-01-15 NOTE — PROGRESS NOTES
Patient reports good fm, no vaginal bleeding, contractions or rupture of membranes. Overall doing well. Labs reviewed. Questions answered today. Encouraged prenatal classes and lactation visits. Labor, ROM and VB precautions given. Patient would like to deliver in the alternative birthing center - scheduled to see Lisa Kessler on 1/17/2019. Will kumar to see patient for GYN care after delivery.

## 2019-01-16 LAB
HBV SURFACE AG SERPL QL IA: NEGATIVE
HIV 1+2 AB+HIV1 P24 AG SERPL QL IA: NEGATIVE
RPR SER QL: NORMAL
RUBV IGG SER-ACNC: 80.4 IU/ML
RUBV IGG SER-IMP: REACTIVE

## 2019-01-17 ENCOUNTER — INITIAL PRENATAL (OUTPATIENT)
Dept: OBSTETRICS AND GYNECOLOGY | Facility: CLINIC | Age: 34
End: 2019-01-17
Payer: COMMERCIAL

## 2019-01-17 VITALS — WEIGHT: 256.5 LBS | SYSTOLIC BLOOD PRESSURE: 116 MMHG | BODY MASS INDEX: 39 KG/M2 | DIASTOLIC BLOOD PRESSURE: 80 MMHG

## 2019-01-17 DIAGNOSIS — Z34.03 ENCOUNTER FOR SUPERVISION OF NORMAL FIRST PREGNANCY IN THIRD TRIMESTER: Primary | ICD-10-CM

## 2019-01-17 PROCEDURE — 3008F PR BODY MASS INDEX (BMI) DOCUMENTED: ICD-10-PCS | Mod: CPTII,S$GLB,, | Performed by: ADVANCED PRACTICE MIDWIFE

## 2019-01-17 PROCEDURE — 3008F BODY MASS INDEX DOCD: CPT | Mod: CPTII,S$GLB,, | Performed by: ADVANCED PRACTICE MIDWIFE

## 2019-01-17 PROCEDURE — 99999 PR PBB SHADOW E&M-EST. PATIENT-LVL III: CPT | Mod: PBBFAC,,, | Performed by: ADVANCED PRACTICE MIDWIFE

## 2019-01-17 PROCEDURE — 99999 PR PBB SHADOW E&M-EST. PATIENT-LVL III: ICD-10-PCS | Mod: PBBFAC,,, | Performed by: ADVANCED PRACTICE MIDWIFE

## 2019-01-17 PROCEDURE — 99214 PR OFFICE/OUTPT VISIT, EST, LEVL IV, 30-39 MIN: ICD-10-PCS | Mod: S$GLB,,, | Performed by: ADVANCED PRACTICE MIDWIFE

## 2019-01-17 PROCEDURE — 99214 OFFICE O/P EST MOD 30 MIN: CPT | Mod: S$GLB,,, | Performed by: ADVANCED PRACTICE MIDWIFE

## 2019-01-17 PROCEDURE — 87086 URINE CULTURE/COLONY COUNT: CPT

## 2019-01-17 NOTE — PROGRESS NOTES
Here for late transfer of pnc from Dr. Tesfaye  Here with wife ESHA  Desires NCB/ABC   Denies VB, LOF or ctxBMI 38.2  -- Discussed IOM recommended weight gain of:   Underweight Less than 18.5 28-40    Normal Weight 18.5-24.9  25-35    Overweight 25-29.9  15-25    Obese   30 and greater  11-20   -- Discussed criteria for delivery at [facility] r/t excessive pre-preg weight or excessive weight gain:   Pre-pregnancy BMI over 40 or excess pregnancy weight gain defined as:   Pre-preg BMI < 18.5; Excess weight gain = > 60 pound   Pre-preg BMI 18.5-24.9;  Excess weight gain = > 53 pounds   Pre-preg BMI 25-29.9;  Excess weight gain = > 38 pounds   Pre-preg BMI > 30;  Excess weight gain = > 30 pounds  Discussed  Midwifery care  Answered questions.   Reviewed labs and cultures.  Discussed wt gain and ABC guidelines:  Birth Center Risk Assessment: 0- Meets birth center guidelines     0- CNM management in ABC  1- CNM management on L&D  2- Consultation with OB to develop  plan of care  3- Collaborative CNM/OB management with delivery on L&D  4- Permanent referral of care to MD    Also interested in possible tub labor and birth  Taking [facility]  Will have durga

## 2019-01-19 LAB
BACTERIA UR CULT: NORMAL
BACTERIA UR CULT: NORMAL

## 2019-01-31 ENCOUNTER — ROUTINE PRENATAL (OUTPATIENT)
Dept: OBSTETRICS AND GYNECOLOGY | Facility: CLINIC | Age: 34
End: 2019-01-31
Payer: COMMERCIAL

## 2019-01-31 VITALS
BODY MASS INDEX: 39.17 KG/M2 | DIASTOLIC BLOOD PRESSURE: 82 MMHG | SYSTOLIC BLOOD PRESSURE: 128 MMHG | WEIGHT: 257.63 LBS

## 2019-01-31 DIAGNOSIS — Z34.93 PRENATAL CARE IN THIRD TRIMESTER: Primary | ICD-10-CM

## 2019-01-31 PROCEDURE — 0502F SUBSEQUENT PRENATAL CARE: CPT | Mod: S$GLB,,, | Performed by: ADVANCED PRACTICE MIDWIFE

## 2019-01-31 PROCEDURE — 0502F PR SUBSEQUENT PRENATAL CARE: ICD-10-PCS | Mod: S$GLB,,, | Performed by: ADVANCED PRACTICE MIDWIFE

## 2019-01-31 PROCEDURE — 99999 PR PBB SHADOW E&M-EST. PATIENT-LVL II: ICD-10-PCS | Mod: PBBFAC,,, | Performed by: ADVANCED PRACTICE MIDWIFE

## 2019-01-31 PROCEDURE — 99999 PR PBB SHADOW E&M-EST. PATIENT-LVL II: CPT | Mod: PBBFAC,,, | Performed by: ADVANCED PRACTICE MIDWIFE

## 2019-01-31 NOTE — PROGRESS NOTES
33 y.o. female  at 33w4d by delmis  Doing well without concerns   TW.9 lbs   Reviewed warning signs, normal FM/FKCs, reviewed labor precautions and how/when to call.  RTC x 2 wks, call or present sooner prn.   Reviewed gbs and 3rd trimester labs.  Nereyda Silva CNM, MSN  2019   Discussed asthma plan of care  During labor    3:41 PM   name: Gale Barry (plans to bring her to an appointment once having weekly appointments

## 2019-02-14 ENCOUNTER — ROUTINE PRENATAL (OUTPATIENT)
Dept: OBSTETRICS AND GYNECOLOGY | Facility: CLINIC | Age: 34
End: 2019-02-14
Payer: COMMERCIAL

## 2019-02-14 VITALS
SYSTOLIC BLOOD PRESSURE: 112 MMHG | WEIGHT: 261.13 LBS | BODY MASS INDEX: 39.71 KG/M2 | DIASTOLIC BLOOD PRESSURE: 70 MMHG

## 2019-02-14 DIAGNOSIS — F41.8 DEPRESSION WITH ANXIETY: ICD-10-CM

## 2019-02-14 DIAGNOSIS — Z34.93 PRENATAL CARE IN THIRD TRIMESTER: Primary | ICD-10-CM

## 2019-02-14 DIAGNOSIS — J45.909 ASTHMA, UNSPECIFIED ASTHMA SEVERITY, UNSPECIFIED WHETHER COMPLICATED, UNSPECIFIED WHETHER PERSISTENT: ICD-10-CM

## 2019-02-14 PROBLEM — F41.9 ANXIETY: Status: RESOLVED | Noted: 2017-05-31 | Resolved: 2019-02-14

## 2019-02-14 PROCEDURE — 0502F PR SUBSEQUENT PRENATAL CARE: ICD-10-PCS | Mod: CPTII,S$GLB,, | Performed by: ADVANCED PRACTICE MIDWIFE

## 2019-02-14 PROCEDURE — 87081 CULTURE SCREEN ONLY: CPT

## 2019-02-14 PROCEDURE — 99999 PR PBB SHADOW E&M-EST. PATIENT-LVL III: ICD-10-PCS | Mod: PBBFAC,,, | Performed by: ADVANCED PRACTICE MIDWIFE

## 2019-02-14 PROCEDURE — 99999 PR PBB SHADOW E&M-EST. PATIENT-LVL III: CPT | Mod: PBBFAC,,, | Performed by: ADVANCED PRACTICE MIDWIFE

## 2019-02-14 PROCEDURE — 87491 CHLMYD TRACH DNA AMP PROBE: CPT

## 2019-02-14 PROCEDURE — 0502F SUBSEQUENT PRENATAL CARE: CPT | Mod: CPTII,S$GLB,, | Performed by: ADVANCED PRACTICE MIDWIFE

## 2019-02-14 NOTE — PROGRESS NOTES
33 y.o. female  at 35w4d  With wife ESHA today  Reports + FM, denies VB, LOF or regular CTX  Asthma  -- Symbicort daily. Albuterol inhaler prn. No attacks/hospitalizations this pregnancy and reports not having a hospitalization since she was about 17yo. Hasn't seen MFM this pregnancy. Offered and ordered 3T US.   Dep/anx  -- Well butrin daily. Stable, no HI/SI, plans to continue  TWG: 10 lbs   GBS collected today   Reviewed [facility] recommendation for repeat HIV/RPR today; she will obtain   Reviewed warning signs, normal FKCs, labor precautions and how/when to call.  RTC x 1 wks, call or present sooner prn.     Birth Center Risk Assessment: 0  0- CNM management in ABC  1- CNM management on L&D  2- Consultation with OB to develop plan of care  3- Collaborative CNM/OB management with delivery on L&D  4- Referral or transfer of care to MD

## 2019-02-17 LAB
C TRACH DNA SPEC QL NAA+PROBE: NOT DETECTED
N GONORRHOEA DNA SPEC QL NAA+PROBE: NOT DETECTED

## 2019-02-18 LAB — BACTERIA SPEC AEROBE CULT: NORMAL

## 2019-02-19 ENCOUNTER — TELEPHONE (OUTPATIENT)
Dept: MATERNAL FETAL MEDICINE | Facility: CLINIC | Age: 34
End: 2019-02-19

## 2019-02-19 NOTE — TELEPHONE ENCOUNTER
Message left for pt to call [facility] clinic at [phone] to schedule follow-up growth ultrasound.

## 2019-02-20 ENCOUNTER — ROUTINE PRENATAL (OUTPATIENT)
Dept: OBSTETRICS AND GYNECOLOGY | Facility: CLINIC | Age: 34
End: 2019-02-20
Payer: COMMERCIAL

## 2019-02-20 VITALS
SYSTOLIC BLOOD PRESSURE: 114 MMHG | BODY MASS INDEX: 40.19 KG/M2 | DIASTOLIC BLOOD PRESSURE: 80 MMHG | WEIGHT: 264.31 LBS

## 2019-02-20 DIAGNOSIS — Z34.03 ENCOUNTER FOR SUPERVISION OF NORMAL FIRST PREGNANCY IN THIRD TRIMESTER: Primary | ICD-10-CM

## 2019-02-20 PROCEDURE — 3008F BODY MASS INDEX DOCD: CPT | Mod: CPTII,S$GLB,, | Performed by: ADVANCED PRACTICE MIDWIFE

## 2019-02-20 PROCEDURE — 99999 PR PBB SHADOW E&M-EST. PATIENT-LVL II: ICD-10-PCS | Mod: PBBFAC,,, | Performed by: ADVANCED PRACTICE MIDWIFE

## 2019-02-20 PROCEDURE — 99213 PR OFFICE/OUTPT VISIT, EST, LEVL III, 20-29 MIN: ICD-10-PCS | Mod: S$GLB,,, | Performed by: ADVANCED PRACTICE MIDWIFE

## 2019-02-20 PROCEDURE — 99213 OFFICE O/P EST LOW 20 MIN: CPT | Mod: S$GLB,,, | Performed by: ADVANCED PRACTICE MIDWIFE

## 2019-02-20 PROCEDURE — 99999 PR PBB SHADOW E&M-EST. PATIENT-LVL II: CPT | Mod: PBBFAC,,, | Performed by: ADVANCED PRACTICE MIDWIFE

## 2019-02-20 PROCEDURE — 3008F PR BODY MASS INDEX (BMI) DOCUMENTED: ICD-10-PCS | Mod: CPTII,S$GLB,, | Performed by: ADVANCED PRACTICE MIDWIFE

## 2019-02-26 ENCOUNTER — ROUTINE PRENATAL (OUTPATIENT)
Dept: OBSTETRICS AND GYNECOLOGY | Facility: CLINIC | Age: 34
End: 2019-02-26
Payer: COMMERCIAL

## 2019-02-26 ENCOUNTER — LAB VISIT (OUTPATIENT)
Dept: LAB | Facility: OTHER | Age: 34
End: 2019-02-26
Payer: COMMERCIAL

## 2019-02-26 ENCOUNTER — TELEPHONE (OUTPATIENT)
Dept: MATERNAL FETAL MEDICINE | Facility: CLINIC | Age: 34
End: 2019-02-26

## 2019-02-26 VITALS
SYSTOLIC BLOOD PRESSURE: 104 MMHG | WEIGHT: 269.19 LBS | DIASTOLIC BLOOD PRESSURE: 74 MMHG | BODY MASS INDEX: 40.93 KG/M2

## 2019-02-26 DIAGNOSIS — Z34.93 PRENATAL CARE IN THIRD TRIMESTER: Primary | ICD-10-CM

## 2019-02-26 DIAGNOSIS — Z34.93 PRENATAL CARE IN THIRD TRIMESTER: ICD-10-CM

## 2019-02-26 LAB — HIV 1+2 AB+HIV1 P24 AG SERPL QL IA: NEGATIVE

## 2019-02-26 PROCEDURE — 36415 COLL VENOUS BLD VENIPUNCTURE: CPT

## 2019-02-26 PROCEDURE — 0502F PR SUBSEQUENT PRENATAL CARE: ICD-10-PCS | Mod: CPTII,S$GLB,, | Performed by: ADVANCED PRACTICE MIDWIFE

## 2019-02-26 PROCEDURE — 99999 PR PBB SHADOW E&M-EST. PATIENT-LVL III: ICD-10-PCS | Mod: PBBFAC,,, | Performed by: ADVANCED PRACTICE MIDWIFE

## 2019-02-26 PROCEDURE — 86703 HIV-1/HIV-2 1 RESULT ANTBDY: CPT

## 2019-02-26 PROCEDURE — 86592 SYPHILIS TEST NON-TREP QUAL: CPT

## 2019-02-26 PROCEDURE — 99999 PR PBB SHADOW E&M-EST. PATIENT-LVL III: CPT | Mod: PBBFAC,,, | Performed by: ADVANCED PRACTICE MIDWIFE

## 2019-02-26 PROCEDURE — 0502F SUBSEQUENT PRENATAL CARE: CPT | Mod: CPTII,S$GLB,, | Performed by: ADVANCED PRACTICE MIDWIFE

## 2019-02-26 NOTE — PROGRESS NOTES
33 y.o. female  at 37w2d  With ESHA today  Reports + FM, denies VB, LOF or regular CTX  Brings birth plan ; reviewed, copy to them and copy to med records  Encouraged to consider pediatrician  Consents reviewed and signed  Asthma  -- Symbicort daily. Albuterol inhaler prn. No attacks/hospitalizations this pregnancy and reports not having a hospitalization since she was about 17yo. Has [facility] scheduled  Dep/anx  -- Well butrin daily. Stable, no HI/SI, plans to continue  TW lbs   Reviewed GBS neg  Reviewed repeat HIV/RPR - she forgot to obtain; will do so today  Reviewed warning signs, normal FKCs, labor precautions and how/when to call.  RTC x 1 wks, call or present sooner prn.     Birth Center Risk Assessment: 0  0- CNM management in ABC  1- CNM management on L&D  2- Consultation with OB to develop plan of care  3- Collaborative CNM/OB management with delivery on L&D  4- Referral or transfer of care to MD

## 2019-02-26 NOTE — TELEPHONE ENCOUNTER
Message left for pt to call [facility] clinic at [phone] to confirm OB ultrasound for 2/27/19 at 820am.

## 2019-02-27 ENCOUNTER — PROCEDURE VISIT (OUTPATIENT)
Dept: MATERNAL FETAL MEDICINE | Facility: CLINIC | Age: 34
End: 2019-02-27
Payer: COMMERCIAL

## 2019-02-27 DIAGNOSIS — J45.909 ASTHMA, UNSPECIFIED ASTHMA SEVERITY, UNSPECIFIED WHETHER COMPLICATED, UNSPECIFIED WHETHER PERSISTENT: ICD-10-CM

## 2019-02-27 DIAGNOSIS — Z34.93 PRENATAL CARE IN THIRD TRIMESTER: ICD-10-CM

## 2019-02-27 PROCEDURE — 76816 PR  US,PREGNANT UTERUS,F/U,TRANSABD APP: ICD-10-PCS | Mod: S$GLB,,, | Performed by: PEDIATRICS

## 2019-02-27 PROCEDURE — 76819 FETAL BIOPHYS PROFIL W/O NST: CPT | Mod: S$GLB,,, | Performed by: PEDIATRICS

## 2019-02-27 PROCEDURE — 76819 PR US, OB, FETAL BIOPHYSICAL, W/O NST: ICD-10-PCS | Mod: S$GLB,,, | Performed by: PEDIATRICS

## 2019-02-27 PROCEDURE — 76816 OB US FOLLOW-UP PER FETUS: CPT | Mod: S$GLB,,, | Performed by: PEDIATRICS

## 2019-02-27 PROCEDURE — 99499 NO LOS: ICD-10-PCS | Mod: S$GLB,,, | Performed by: PEDIATRICS

## 2019-02-27 PROCEDURE — 99499 UNLISTED E&M SERVICE: CPT | Mod: S$GLB,,, | Performed by: PEDIATRICS

## 2019-02-28 LAB — RPR SER QL: NORMAL

## 2019-03-03 NOTE — PROGRESS NOTES
"Indication  ========    Evaluation of fetal growth; hx of asthma; BPP.    History  ======    Risk Factors  Details: IUI  History risk factors: Obesity  Details: BMI = 38    Pregnancy History  ==============    Maternal Lab Tests  Genetic screening declined.      Method  ======    Transabdominal ultrasound examination, [place] E10. View: Sufficient.    Pregnancy  =========    Neumann pregnancy. Number of fetuses: 1.    Dating  ======    Cycle: regular cycle  GA by "stated dating" 37 w + 3 d  GERMAN by "stated dating": 3/17/2019  "Stated dating" by: IUI procedure date  Ultrasound examination on: 2/27/2019  GA by U/S based upon: AC, BPD, Femur, HC  GA by U/S 37 w + 3 d  GERMAN by U/S: 3/17/2019  Assigned: Dating performed on 10/29/2018, based on the external assessment (by IUI procedure date )  Assigned GA 37 w + 3 d  Assigned GERMAN: 3/17/2019    General Evaluation  ==============    Cardiac activity: present.  bpm.  Fetal movements: visualized.  Presentation: cephalic.  Placenta:  Placental site: posterior, fundal.  Amniotic fluid: Amount of AF: normal amount. MVP 6.3 cm.    Biophysical Profile  ==============    2: Fetal breathing movements  2: Gross body movements  2: Fetal tone  2: Amniotic fluid volume  8/8: Biophysical profile score  Interpretation: normal    Fetal Biometry  ============    Fetal Biometry  BPD 92.5 mm 37w 4d Hadlock  .2 mm  .8 mm 37w 4d Hadlock  .2 mm 37w 6d Hadlock  Femur 72.1 mm 36w 6d Hadlock  EFW 3,242 g 55% Yazan  Calculated by: Hadlock (BPD-HC-AC-FL)  EFW (lb) 7 lb  EFW (oz) 2 oz  Cephalic index 0.80  HC / AC 0.97  FL / BPD 0.78  FL / AC 0.21  MVP 6.3 cm   bpm    Fetal Anatomy  ============    Cranium: normal  4-chamber view: documented previously  Stomach: normal  Kidneys: normal  Bladder: normal  Gender: female  Wants to know gender: yes            Impression  =========      Fetal size is AGA with the EFW at the 55th percentile.  Normal repeat limited fetal " anatomic survey.  AFV is normal.    BPP is 8 of 8.          Recommendation  ==============    Repeat ultrasound study as clinically indicated.      Thank you again for allowing us to participate in the care of your patients. If you have any questions concerning today's consultation, feel free  to contact me or one of my partners. We can be reached at [phone] during normal business hours. If you have a question after normal  business hours, please contact Labor and Delivery at [phone].

## 2019-03-07 ENCOUNTER — ROUTINE PRENATAL (OUTPATIENT)
Dept: OBSTETRICS AND GYNECOLOGY | Facility: CLINIC | Age: 34
End: 2019-03-07
Payer: COMMERCIAL

## 2019-03-07 VITALS — BODY MASS INDEX: 41 KG/M2 | DIASTOLIC BLOOD PRESSURE: 62 MMHG | WEIGHT: 269.63 LBS | SYSTOLIC BLOOD PRESSURE: 106 MMHG

## 2019-03-07 DIAGNOSIS — Z34.93 PRENATAL CARE IN THIRD TRIMESTER: Primary | ICD-10-CM

## 2019-03-07 PROCEDURE — 99999 PR PBB SHADOW E&M-EST. PATIENT-LVL III: ICD-10-PCS | Mod: PBBFAC,,, | Performed by: ADVANCED PRACTICE MIDWIFE

## 2019-03-07 PROCEDURE — 0502F SUBSEQUENT PRENATAL CARE: CPT | Mod: CPTII,S$GLB,, | Performed by: ADVANCED PRACTICE MIDWIFE

## 2019-03-07 PROCEDURE — 99999 PR PBB SHADOW E&M-EST. PATIENT-LVL III: CPT | Mod: PBBFAC,,, | Performed by: ADVANCED PRACTICE MIDWIFE

## 2019-03-07 PROCEDURE — 0502F PR SUBSEQUENT PRENATAL CARE: ICD-10-PCS | Mod: CPTII,S$GLB,, | Performed by: ADVANCED PRACTICE MIDWIFE

## 2019-03-07 NOTE — PROGRESS NOTES
33 y.o. female  at 38w4d  Unaccompanied today  Requests SVE  Reports feeling normal + FM, denies VB, LOF or regular cramping  Asthma  -- Symbicort daily. Albuterol inhaler prn. No attacks/hospitalizations this pregnancy and reports not having a hospitalization since she was about 15yo.   Dep/anx  -- Well butrin daily. Stable, no HI/SI, plans to continue  TW lbs   Reviewed recent US - [facility]  Delivery consents already signed  Reviewed warning signs, normal FKCs, labor precautions and how/when to call.  RTC x 1 wks, call or present sooner prn.     Birth Center Risk Assessment: 0  0- CNM management in ABC  1- CNM management on L&D  2- Consultation with OB to develop plan of care  3- Collaborative CNM/OB management with delivery on L&D  4- Referral or transfer of care to MD

## 2019-03-13 ENCOUNTER — PATIENT MESSAGE (OUTPATIENT)
Dept: PRIMARY CARE CLINIC | Facility: CLINIC | Age: 34
End: 2019-03-13

## 2019-03-13 DIAGNOSIS — J45.30 ASTHMA, ALLERGIC, MILD PERSISTENT, UNCOMPLICATED: Primary | ICD-10-CM

## 2019-03-14 ENCOUNTER — ROUTINE PRENATAL (OUTPATIENT)
Dept: OBSTETRICS AND GYNECOLOGY | Facility: CLINIC | Age: 34
End: 2019-03-14
Payer: COMMERCIAL

## 2019-03-14 VITALS
DIASTOLIC BLOOD PRESSURE: 76 MMHG | BODY MASS INDEX: 40.51 KG/M2 | SYSTOLIC BLOOD PRESSURE: 120 MMHG | WEIGHT: 266.44 LBS

## 2019-03-14 DIAGNOSIS — Z34.93 PRENATAL CARE IN THIRD TRIMESTER: Primary | ICD-10-CM

## 2019-03-14 DIAGNOSIS — Z3A.39 39 WEEKS GESTATION OF PREGNANCY: ICD-10-CM

## 2019-03-14 PROCEDURE — 99999 PR PBB SHADOW E&M-EST. PATIENT-LVL II: ICD-10-PCS | Mod: PBBFAC,,, | Performed by: ADVANCED PRACTICE MIDWIFE

## 2019-03-14 PROCEDURE — 0502F PR SUBSEQUENT PRENATAL CARE: ICD-10-PCS | Mod: CPTII,S$GLB,, | Performed by: ADVANCED PRACTICE MIDWIFE

## 2019-03-14 PROCEDURE — 99999 PR PBB SHADOW E&M-EST. PATIENT-LVL II: CPT | Mod: PBBFAC,,, | Performed by: ADVANCED PRACTICE MIDWIFE

## 2019-03-14 PROCEDURE — 0502F SUBSEQUENT PRENATAL CARE: CPT | Mod: CPTII,S$GLB,, | Performed by: ADVANCED PRACTICE MIDWIFE

## 2019-03-14 RX ORDER — ALBUTEROL SULFATE 90 UG/1
2 AEROSOL, METERED RESPIRATORY (INHALATION) EVERY 6 HOURS PRN
Qty: 18 G | Refills: 1 | OUTPATIENT
Start: 2019-03-14

## 2019-03-14 NOTE — PATIENT INSTRUCTIONS
Labor and Childbirth: Active Labor  During active labor, your contractions will be stronger and more rhythmic than with early labor. They peak and subside like waves. They may happen 3 to 5 minutes apart and last about 45 to 60 seconds. This part of labor can be hard work. But it is often shorter than early labor. When you reach active labor, exams and tests will be done to see how you and your baby are doing.     Your cervix may dilate 4 to 8 centimeters during the first part of active labor.   Evaluating you and your baby  An exam tells how you and your baby are responding to contractions. Your blood pressure, temperature, and pulse will be checked. A blood or urine sample may also be taken. A fetal monitor will be used to check your babys heart rate. Sometimes an IV (intravenous) line is started to give you medicine and fluids.  Moving ahead with labor  You may now feel contractions in your whole stomach instead of just the lower part (like during early labor). If your amniotic sac has not broken already, it may break now. Or, it may be broken for you. To help your baby descend, change position often. Walking or sitting in a rocking chair or recliner may help. You may find it hard to relax even though you are tired. You may also be less interested in talking than you were earlier. If youre having anesthesia, you will get it now.   Special issues during labor  If labor doesnt progress well or a problem arises, you may need a . But your healthcare providers may take certain steps to help you avoid a :  · If your cervix isnt dilating, a medicine (oxytocin) may be used to augment labor.  · If fetal monitoring shows your baby isnt getting enough oxygen, shifting your body position may help. You may also be given oxygen through a mask.  · If you have preeclampsia (a condition that results in high blood pressure, swelling, and other symptoms), you may be given medicines by IV (intravenous). Your  healthcare provider may also tell you to lie on your left side.  Responding to contractions  During contractions, try to stay relaxed. Tense muscles use more oxygen, eat up your bodys energy, and increase pain. Use the breathing and relaxation techniques you may have learned. And let your support person know how he or she can help. If youve had problems during a previous birth, focus on the present. Keep in mind that no 2 births are the same.     Support persons note  Here's how you can help:  · Have the mother walk or change positions at least once an hour. This improves circulation and helps the baby descend.  · Keep reminding the mother to breathe and relax through each contraction.  · Reassure her. Try to keep her from getting anxious or overstressed.  · Take care of yourself. Take a short break to eat or go to the bathroom when you need to.  · Rest when the mother does. Youll both benefit.   Date Last Reviewed: 8/16/2015  © 7411-3320 [employer]. [address] [place] [address]. All rights reserved. This information is not intended as a substitute for professional medical care. Always follow your healthcare professional's instructions.        Labor and Childbirth: Your Body Prepares  Labor is the series of uterine contractions that dilate (open) and efface (thin) your cervix for birth. Your due date is a guide to when labor will begin, but babies often come days or weeks before or after due dates. Even so, labor need not take you by surprise. In the last weeks of pregnancy, you or your healthcare provider may notice changes that mean labor is near.     Changes in your body  Physical changes often signal that your baby will soon be born:  · Discharge from your vagina may increase and become thicker. You may notice a pink or brownish discharge called the bloody show.  · The mucous plug may break down over a few weeks or all at once. Losing the plug doesnt mean that labor will start  right away.  · You may feel [place] contractions (false labor). These irregular contractions start to soften and thin the cervix. Many women mistake these contractions for true labor. They may be more noticeable towards the end of the day.  · Feeling like the baby has dropped lower. In preparation for birth, the baby's head has settled deep into your pelvis.   Date Last Reviewed: 8/16/2015  © 6939-6115 [employer]. [address] [place] [address], [place] [address]. All rights reserved. This information is not intended as a substitute for professional medical care. Always follow your healthcare professional's instructions.        Recognizing Labor    The beginning of labor is the beginning of birth. Youll start to feel strong contractions. Thats when the muscles of your uterus tighten up to help push your baby out during birth.  Yes, labor has probably started   Signs of labor include:  · Your contractions are getting stronger and more painful instead of weaker. Youll probably feel them throughout your whole uterus.  · Your contractions are regular. This means that you feel them about every 5 to 10 minutes. And they are getting closer together.  · You have pink-colored or blood-streaked fluid from your vagina.  · Your water breaks. It may be a gush or a slow trickle of clear fluid from your vagina.  No, its probably not real labor   Signs of false labor include:  · Your contractions arent regular or strong.  · You feel the contractions only in your lower uterus.  · Your contractions go away when you walk or change position.  · Your contractions go away after drinking fluids.  When to call your healthcare provider  Call your healthcare provider or clinic right away if you notice any of these signs:  · Fluid from your vagina, with or without contractions.  · Bleeding heavy enough that you need a sanitary pad.  · You dont feel your baby moving as much as before.     NOTE: Contractions are timed by both  of these measures:  · The length of each contraction from its start to its finish.  · How far apart the contractions are -- the time between the start of one contraction and the start of the next contraction.   Date Last Reviewed: 8/9/2015  © 1551-7106 [employer]. [address] [place] [address]. All rights reserved. This information is not intended as a substitute for professional medical care. Always follow your healthcare professional's instructions.        Stages of Labor    Labor has 3 stages. Your healthcare provider may talk about the progress of your labor with certain words. One of these is your babys position. Another is your babys station. And the effacement and dilation of your cervix will be noted. Read below to learn about these terms and the 3 stages of labor.  Your baby moves into position  Position is your babys placement in your uterus. Your baby may be facing left or right. He or she may be head first or feet first. Station refers to how far your baby has moved down into your pelvic cavity.  First stage of labor  During the first stage of labor, contractions of the uterus help your cervix thin (efface). They also help it widen (dilate). This will help your baby pass through the birth canal (vagina). At first your contractions will not come that often or last that long. But as time passes, they will come more often, they may be more painful, and they will last longer. They will then be 5 to 30 minutes apart. They will last about 30 to 45 seconds each.  Second stage of labor  In the second stage of labor, you will have stronger contractions of your uterus. They may happen every 3 to 5 minutes. They may last from 45 to 90 seconds each. Your baby will move down the birth canal. Your healthcare provider will ask you to push with each contraction. Try to rest between the contractions if you can. Your baby is delivered at the end of this stage of labor.  Third stage of  labor  The third stage of labor comes after your baby is born. This is when the afterbirth (placenta) comes out of your uterus. Your uterus will continue to contract. But the contractions are much milder than before.  Date Last Reviewed: 8/16/2015  © 0135-8016 [employer]. [address] [place] [address]. All rights reserved. This information is not intended as a substitute for professional medical care. Always follow your healthcare professional's instructions.        Labor: Preparing for the Hospital    During the final weeks of your pregnancy, you may have irregular contractions. You may feel a drop in your babys position. And the profile of your stomach may look a little different. Because due dates are not exact, have your bag packed and ready for the hospital.  Packing for the hospital  Here are some items you may want to have ready for the hospital:  · A watch or clock with a second hand  · Insurance card and identification  · Nursing bra, nursing pads, and maternity underwear  · Toiletries  · Something to entertain yourself, like books or a handheld computer  · Heavy socks or slippers and a robe  · Clips for your hair, a brush, and lip balm  · An MP3 or other music player  · A camera or video camera and   · Important phone numbers or email addresses  · Going-home outfits for you and your baby  · A car seat to take your baby home in  Leaving for the hospital  Follow the instructions youve been given on when to leave for the hospital. You may be told to call your healthcare provider when it becomes hard to walk or talk during contractions or if your amniotic sac breaks. If your partner makes the phone call for you, be nearby. That way, your healthcare provider can speak to you directly. Many women are told to go to the hospital after an hour of contractions that come 3 to 5 minutes apart. Leave sooner if the hospital is not nearby or is hard to get to.  Date Last Reviewed:  8/16/2015 © 2000-2017 [employer]. [address] [place] [address], [place] [address]. All rights reserved. This information is not intended as a substitute for professional medical care. Always follow your healthcare professional's instructions.        Pregnancy and Childbirth: What to Bring to the Hospital    Youre likely feeling anxious as your childs birth approaches. This is normal. To give yourself some peace of mind, pack a bag ahead of time. Do this about 1 month ahead of your estimated delivery date. Here is a list of things to remember:  · Personal care items, like a toothbrush, hair brush, lip balm, lotion, and shampoo  · Eyeglasses (if you wear them)  · Nightgown (if you plan to breastfeed, pack 1 that allows for nursing)  · Nursing bra if you plan to breastfeed  · Bathrobe and slippers  · Many hospitals provide maternity underwear, but you may want to bring underwear that can be soiled because you will have bleeding after delivery  · Comfortable clothes for you to wear home, like sweat pants, yoga pants, or other stretchable clothes, because your prepregnancy clothes may not fit after delivery of your baby  · Clothes for your baby to wear home  · Personal music player and headphones  · Camera with new batteries or   · Coins for vending machines  · Telephone numbers of people to call after the birth  · Cell phone and   · Insurance information and any other paperwork needed for your hospital stay  · A list of baby names you are considering  · An infant, rear-facing car seat for bringing home your baby (this is required by law)  Add anything else that you dont want to forget:  _____________________________________  _____________________________________  _____________________________________  _____________________________________  _____________________________________  _____________________________________  _____________________________________  Date Last Reviewed: 8/7/2015  ©  3887-6230 [employer]. [address] [place] [address], [place] [address]. All rights reserved. This information is not intended as a substitute for professional medical care. Always follow your healthcare professional's instructions.

## 2019-03-14 NOTE — PROGRESS NOTES
33 y.o. female  at 39w4d by  Reports + FM, denies VB, LOF or regular CTX  Doing well without concerns   TWG: 15 lbs   Discussed post dates testing  Reviewed warning signs, normal FKCs, labor precautions and how/when to call.  RTC x 1 wks, call or present sooner prn.     Birth Center Risk Assessment: 0  0- CNM management in ABC  1- CNM management on L&D  2- Consultation with OB to develop plan of care  3- Collaborative CNM/OB management with delivery on L&D  4- Referral or transfer of care to MD

## 2019-03-15 ENCOUNTER — PATIENT MESSAGE (OUTPATIENT)
Dept: PRIMARY CARE CLINIC | Facility: CLINIC | Age: 34
End: 2019-03-15

## 2019-03-19 ENCOUNTER — PATIENT MESSAGE (OUTPATIENT)
Dept: OBSTETRICS AND GYNECOLOGY | Facility: CLINIC | Age: 34
End: 2019-03-19

## 2019-03-20 ENCOUNTER — ROUTINE PRENATAL (OUTPATIENT)
Dept: OBSTETRICS AND GYNECOLOGY | Facility: CLINIC | Age: 34
End: 2019-03-20
Payer: COMMERCIAL

## 2019-03-20 ENCOUNTER — TELEPHONE (OUTPATIENT)
Dept: OBSTETRICS AND GYNECOLOGY | Facility: HOSPITAL | Age: 34
End: 2019-03-20

## 2019-03-20 VITALS
WEIGHT: 266.44 LBS | DIASTOLIC BLOOD PRESSURE: 80 MMHG | BODY MASS INDEX: 40.51 KG/M2 | SYSTOLIC BLOOD PRESSURE: 110 MMHG

## 2019-03-20 DIAGNOSIS — Z34.93 PREGNANCY WITH ONE FETUS IN THIRD TRIMESTER: Primary | ICD-10-CM

## 2019-03-20 PROCEDURE — 0502F PR SUBSEQUENT PRENATAL CARE: ICD-10-PCS | Mod: CPTII,S$GLB,, | Performed by: ADVANCED PRACTICE MIDWIFE

## 2019-03-20 PROCEDURE — 0502F SUBSEQUENT PRENATAL CARE: CPT | Mod: CPTII,S$GLB,, | Performed by: ADVANCED PRACTICE MIDWIFE

## 2019-03-20 PROCEDURE — 99999 PR PBB SHADOW E&M-EST. PATIENT-LVL II: ICD-10-PCS | Mod: PBBFAC,,, | Performed by: ADVANCED PRACTICE MIDWIFE

## 2019-03-20 PROCEDURE — 99999 PR PBB SHADOW E&M-EST. PATIENT-LVL II: CPT | Mod: PBBFAC,,, | Performed by: ADVANCED PRACTICE MIDWIFE

## 2019-03-20 NOTE — PROGRESS NOTES
Patient calls states she has been mona since last night around 0000 after several days of intermittent cramping/ contractions now in a pattern and laboring.  Denies vaginal bleeding, leaking fluid, +fm. Patient currently mona every 5 minutes and still able to talk through contractions but unable to walk through them.  She states her partner and  are with her and they plan to labor at home as long as possible and not r yifan to come to hospital at this time.  Advised Jean Carlos to call back with heavy bleeding, leaking fluid, decreased fetal movement, or when they are headed in to ABC.  She verbalized understanding.  Call switches at 0730 and if we have not heard from Jean Carlos will have the CNM coming on call to check on patient.    Nereyda WESLEYM, MSN

## 2019-03-20 NOTE — TELEPHONE ENCOUNTER
"Called Jean Carlos' phone, no answer. Called ESHA's phone and spoke with ESHA - asked to speak with Jean Carlos as well, and able to.    She reports still mona. UC's subsided some around approx 0600 and she was able to nap.  Reports they have resumed and now occurring approx every 5-6min. Denies LOF, denies bloody show. Reports good FM. Reports eating and tolerating regular diet. Reports has been drinking "lots of water" and has been urinating regularly.    Encouraged pt to come for labor eval and offered pt clinic appt and VE, or to be seen in triage. Reports her  is with her, and "a midwife apprentice" and they plan for her to perfom VE.  Advised against having untrained personnel perform vaginal exams and she verbalizes understanding, but still plans to have her perform this, "if we need it".  Pt has appt scheduled at 2pm today and reports she will call if feels she needs to be seen sooner. Reviewed labor precautions.  "

## 2019-03-20 NOTE — PROGRESS NOTES
33 y.o. female  at 40w3d by 1st tri US  Reports + FM, denies VB, LOF or regular CTX  ESHA and durga De La O here  Has been mona since Friday off and on, most regular at night. Has not slept more than an hour at night, is getting naps in the day time.   Doing well inspite of contractions and feels fairly rested  Discussed IOL at 41 wks if desires. At this time, she does not.  Is going to try and get rest, tub bath and Benadryl if needed  TWG: 15 lbs   Reviewed GBS neg and repeat HIV/RPR N/N  Discussed postdates management and IOL - she prefers to await spontaneous labor if possible   Discussed postdates prenatal testing and that IOL would be recommended immediately if prenatal testing is not reassuring; she states understanding and agrees to this  Scheduled NST/IVET at 41w0d on 3/22/19 and again at 41w3d on 3/25/19  Declines scheduling IOL at this time   Reviewed warning signs, normal FKCs, labor precautions and how/when to call.  RTC x Friday if not delivered, call or present sooner prn.     Birth Center Risk Assessment: 0- Meets birth center guidelines watch weight    0- CNM management in ABC  1- CNM management on L&D  2- Consultation with OB to develop  plan of care  3- Collaborative CNM/OB management with delivery on L&D  4- Permanent referral of care to MD

## 2019-03-21 ENCOUNTER — TELEPHONE (OUTPATIENT)
Dept: OBSTETRICS AND GYNECOLOGY | Facility: CLINIC | Age: 34
End: 2019-03-21

## 2019-03-21 DIAGNOSIS — Z87.59 PERSONAL HISTORY OF PREVIOUS POSTDATES PREGNANCY: Primary | ICD-10-CM

## 2019-03-21 NOTE — PROGRESS NOTES
Phone call with patient  She finally slept last night and is feeling better  Reports normal FM, denies VB, LOF or regular cramping  Discussed postdates management - she declines IOL at 41 wks  Will order and set up PNT for twice weekly starting in 41st week and will need to schedule IOL at 41w6d if still pregnant  Precautions reviewed    Freda Benavides CNM/NP  Certified Nurse Midwife/Nurse Practitioner  3/21/2019 8:11 AM

## 2019-03-22 ENCOUNTER — TELEPHONE (OUTPATIENT)
Dept: OBSTETRICS AND GYNECOLOGY | Facility: HOSPITAL | Age: 34
End: 2019-03-22

## 2019-03-22 NOTE — TELEPHONE ENCOUNTER
----- Message from Nakia Ayoub sent at 3/22/2019  8:17 AM CDT -----  Contact: Giselle  Name of Who is Calling: Giselle patient's spouse      What is the request in detail: Giselle patient's spouse is requesting a call from staff in regards to the patient experiencing contractions, would like the staff to advise what to do. Please contact to further discuss and advise      Can the clinic reply by [facility]: No       What Number to Call Back if not in [facility]: [phone]

## 2019-03-22 NOTE — TELEPHONE ENCOUNTER
Ret pt call regarding ctx. Starting @ midnight. +FM  Denies VB of LOF  Now about 3-5 minutes apart lasting about 1 minute. Last check 2 days ago VE 0-1  Says she wants to come in and be checked out  Will go to [facility] for check

## 2019-03-23 ENCOUNTER — TELEPHONE (OUTPATIENT)
Dept: OBSTETRICS AND GYNECOLOGY | Facility: CLINIC | Age: 34
End: 2019-03-23

## 2019-03-23 ENCOUNTER — HOSPITAL ENCOUNTER (INPATIENT)
Facility: OTHER | Age: 34
LOS: 3 days | Discharge: HOME | End: 2019-03-26
Attending: OBSTETRICS & GYNECOLOGY | Admitting: OBSTETRICS & GYNECOLOGY
Payer: COMMERCIAL

## 2019-03-23 PROBLEM — O42.10 PROM WITH ONSET OF LABOR MORE THAN 24 HOURS FOLLOWING RUPTURE: Status: ACTIVE | Noted: 2019-03-23

## 2019-03-23 LAB
BASOPHILS # BLD AUTO: 0.03 K/UL
BASOPHILS NFR BLD: 0.3 %
DIFFERENTIAL METHOD: ABNORMAL
EOSINOPHIL # BLD AUTO: 0.2 K/UL
EOSINOPHIL NFR BLD: 1.7 %
ERYTHROCYTE [DISTWIDTH] IN BLOOD BY AUTOMATED COUNT: 14.8 %
HCT VFR BLD AUTO: 33.5 %
HGB BLD-MCNC: 11.1 G/DL
LYMPHOCYTES # BLD AUTO: 1.9 K/UL
LYMPHOCYTES NFR BLD: 21.5 %
MCH RBC QN AUTO: 27.1 PG
MCHC RBC AUTO-ENTMCNC: 33.1 G/DL
MCV RBC AUTO: 82 FL
MONOCYTES # BLD AUTO: 1.1 K/UL
MONOCYTES NFR BLD: 12.2 %
NEUTROPHILS # BLD AUTO: 5.6 K/UL
NEUTROPHILS NFR BLD: 64 %
PLATELET # BLD AUTO: 298 K/UL
PMV BLD AUTO: 11.2 FL
RBC # BLD AUTO: 4.1 M/UL
WBC # BLD AUTO: 8.69 K/UL

## 2019-03-23 PROCEDURE — 25000003 PHARM REV CODE 250: Performed by: ADVANCED PRACTICE MIDWIFE

## 2019-03-23 PROCEDURE — 63600175 PHARM REV CODE 636 W HCPCS: Performed by: ADVANCED PRACTICE MIDWIFE

## 2019-03-23 PROCEDURE — 59025 FETAL NON-STRESS TEST: CPT

## 2019-03-23 PROCEDURE — 72100002 HC LABOR CARE, 1ST 8 HOURS

## 2019-03-23 PROCEDURE — 86850 RBC ANTIBODY SCREEN: CPT

## 2019-03-23 PROCEDURE — 85025 COMPLETE CBC W/AUTO DIFF WBC: CPT

## 2019-03-23 PROCEDURE — 99285 EMERGENCY DEPT VISIT HI MDM: CPT | Mod: 25

## 2019-03-23 PROCEDURE — 11000001 HC ACUTE MED/SURG PRIVATE ROOM

## 2019-03-23 RX ORDER — SODIUM CHLORIDE 9 MG/ML
INJECTION, SOLUTION INTRAVENOUS
Status: DISCONTINUED | OUTPATIENT
Start: 2019-03-23 | End: 2019-03-25

## 2019-03-23 RX ORDER — OXYTOCIN/RINGER'S LACTATE 20/1000 ML
2 PLASTIC BAG, INJECTION (ML) INTRAVENOUS CONTINUOUS
Status: DISCONTINUED | OUTPATIENT
Start: 2019-03-23 | End: 2019-03-25

## 2019-03-23 RX ORDER — ONDANSETRON 8 MG/1
8 TABLET, ORALLY DISINTEGRATING ORAL EVERY 8 HOURS PRN
Status: DISCONTINUED | OUTPATIENT
Start: 2019-03-23 | End: 2019-03-25

## 2019-03-23 RX ORDER — SODIUM CHLORIDE, SODIUM LACTATE, POTASSIUM CHLORIDE, CALCIUM CHLORIDE 600; 310; 30; 20 MG/100ML; MG/100ML; MG/100ML; MG/100ML
INJECTION, SOLUTION INTRAVENOUS CONTINUOUS
Status: DISCONTINUED | OUTPATIENT
Start: 2019-03-23 | End: 2019-03-23

## 2019-03-23 RX ORDER — SODIUM CHLORIDE, SODIUM LACTATE, POTASSIUM CHLORIDE, CALCIUM CHLORIDE 600; 310; 30; 20 MG/100ML; MG/100ML; MG/100ML; MG/100ML
INJECTION, SOLUTION INTRAVENOUS CONTINUOUS
Status: DISCONTINUED | OUTPATIENT
Start: 2019-03-23 | End: 2019-03-25

## 2019-03-23 RX ORDER — MISOPROSTOL 200 UG/1
600 TABLET ORAL
Status: DISCONTINUED | OUTPATIENT
Start: 2019-03-23 | End: 2019-03-25

## 2019-03-23 RX ORDER — OXYTOCIN/RINGER'S LACTATE 20/1000 ML
41.7 PLASTIC BAG, INJECTION (ML) INTRAVENOUS CONTINUOUS
Status: ACTIVE | OUTPATIENT
Start: 2019-03-23 | End: 2019-03-24

## 2019-03-23 RX ORDER — OXYTOCIN/RINGER'S LACTATE 20/1000 ML
333 PLASTIC BAG, INJECTION (ML) INTRAVENOUS CONTINUOUS
Status: ACTIVE | OUTPATIENT
Start: 2019-03-23 | End: 2019-03-23

## 2019-03-23 RX ADMIN — SODIUM CHLORIDE, SODIUM LACTATE, POTASSIUM CHLORIDE, AND CALCIUM CHLORIDE: .6; .31; .03; .02 INJECTION, SOLUTION INTRAVENOUS at 10:03

## 2019-03-23 RX ADMIN — Medication 2 MILLI-UNITS/MIN: at 10:03

## 2019-03-23 NOTE — TELEPHONE ENCOUNTER
Patient calls on call CNM states leaking fluid and mona and requests call back.  Called Mandeep and spoke with her at ~ 0829  Reports possible LOF at ~ 2-230am  continues to have leakage of clear fluid since 2-230am, leaking consistently with each contraction.   Reports + normal FM  Denies VB.  +contractions q 7-8min and breathing through most of them, durga is at her house at this point.   Denies fever, chills, malaise  Reviewed that GBS is negative.     Reviewed ROM/ PROM information with patient and birth center admission guidelines (18 hours for  [facility]/ 24 hours we recommend admission and augmentation of labor on L&D if not in active labor)    Offered immediate evaluation and discussed that if she opted for immediate evaluation and was determined to have ROM we admit to L&D--she wishes to labor as long as possible at home, and plans to deliver at [facility].    Also discussed option of expectant management including staying at home and awaiting active labor.     Jean Carlos/ ESHA (her partner got on phone due to a contraction) communicates her desire for expectant management at home as she was hoping to labor and birth at the [facility].        Encouraged her to  -- Take T minimum of every 4 hours and call with fever  -- Call or present immediately with fever, chills, malaise, cristofer VB, change in fluid color, malodor of fluid, decrease in FM, or onset of labor/regular ctx  -- Present for induction of labor at any time should she decide to change her mind from expectant management  -- Present no later than 24 hours post ROM for evaluation and augmentation/induction of labor    Labor and Delivery Staff notified of patient status and [facility] nurse is on call at this point.       Nereyda Silva CNM, MSN  03/23/2019  9:20 AM

## 2019-03-23 NOTE — TELEPHONE ENCOUNTER
Called Rafita and ESHA and spoke with her at ~ 1453  Reports possible LOF at ~ 0200/0230 gasper  She continues to have leakage of clear fluid since  Reports + normal FM  Denies VB or CTX  Denies fever, chills, malaise  States they are outside walking and fluid continues to leak with contractions and has not changed color since initial leakage.     Reviewed PROM information and document sent to patient via email at: [email or web address] (confirmed received by return email)    ESHA states Rafita is tolerating contractions well still and they remain spaced at 7-8min apart.  She is able to walk through them and rest at times as well.  Not a lot has changed since we spoke this am around 0830.  Discussed [facility] admission guidelines that requires active labor at admission and 18hrs of rupture or less (confirmed this with Hemanth Valerio and sent an email to patient to clarify this as well).  ESHA states they will touch base around the 18hr hemanth, and will plan to make decision with CNM and  as to either come in at that point or wait until 24 hrs.  I discussed that at 24hrs we will recommend augmentation of labor if not in active labor and ESHA verbalized understanding of this.    ESHA communicates her desire for expectant management at home as she was hoping to labor and birth at the [facility].    Reviewed r/b of expectant management vs augmentation/induction of prelabor PROM at term.    She communicates her desire to stay home.    Encouraged her to  -- Take T minimum of every 4 hours and call with fever  -- Call or present immediately with fever, chills, malaise, cristofer VB, change in fluid color, malodor of fluid, decrease in FM, or onset of labor/regular ctx  -- Present for induction of labor at any time should she decide to change her mind from expectant management  -- Present no later than 24 hours post ROM for evaluation and augmentation/induction of labor  --Both ESHA and RAFITA understand I am available 24/7 to discuss any concerns or  questions they both have while laboring at home, and have the phone numbers to call.     If I do not hear from them at 18 hours will plan to call and check in with them.  Nereyda Silva CNM, MSN  03/23/2019  3:33 PM

## 2019-03-24 ENCOUNTER — ANESTHESIA EVENT (OUTPATIENT)
Dept: OBSTETRICS AND GYNECOLOGY | Facility: OTHER | Age: 34
End: 2019-03-24
Payer: COMMERCIAL

## 2019-03-24 ENCOUNTER — ANESTHESIA (OUTPATIENT)
Dept: OBSTETRICS AND GYNECOLOGY | Facility: OTHER | Age: 34
End: 2019-03-24
Payer: COMMERCIAL

## 2019-03-24 PROBLEM — F41.8 DEPRESSION WITH ANXIETY: Status: RESOLVED | Noted: 2019-02-14 | Resolved: 2019-03-24

## 2019-03-24 LAB
ABO + RH BLD: NORMAL
BLD GP AB SCN CELLS X3 SERPL QL: NORMAL

## 2019-03-24 PROCEDURE — 25000003 PHARM REV CODE 250

## 2019-03-24 PROCEDURE — 11000001 HC ACUTE MED/SURG PRIVATE ROOM

## 2019-03-24 PROCEDURE — 72200005 HC VAGINAL DELIVERY LEVEL II

## 2019-03-24 PROCEDURE — 25000003 PHARM REV CODE 250: Performed by: ADVANCED PRACTICE MIDWIFE

## 2019-03-24 PROCEDURE — 59400 OBSTETRICAL CARE: CPT | Mod: GB,,, | Performed by: ADVANCED PRACTICE MIDWIFE

## 2019-03-24 PROCEDURE — 51702 INSERT TEMP BLADDER CATH: CPT

## 2019-03-24 PROCEDURE — 0502F PR SUBSEQUENT PRENATAL CARE: ICD-10-PCS | Mod: ,,, | Performed by: MIDWIFE

## 2019-03-24 PROCEDURE — 63600175 PHARM REV CODE 636 W HCPCS: Performed by: ANESTHESIOLOGY

## 2019-03-24 PROCEDURE — 0502F SUBSEQUENT PRENATAL CARE: CPT | Mod: ,,, | Performed by: MIDWIFE

## 2019-03-24 PROCEDURE — 72100003 HC LABOR CARE, EA. ADDL. 8 HRS

## 2019-03-24 PROCEDURE — 25000003 PHARM REV CODE 250: Performed by: ANESTHESIOLOGY

## 2019-03-24 PROCEDURE — 59400 PR FULL ROUT OBSTE CARE,VAGINAL DELIV: ICD-10-PCS | Mod: GB,,, | Performed by: ADVANCED PRACTICE MIDWIFE

## 2019-03-24 PROCEDURE — 59400 PRA FULL ROUT OBSTE CARE,VAGINAL DELIV: ICD-10-PCS | Mod: QY,,, | Performed by: ANESTHESIOLOGY

## 2019-03-24 PROCEDURE — 27800517 HC TRAY,EPIDURAL-CONTINUOUS: Performed by: ANESTHESIOLOGY

## 2019-03-24 PROCEDURE — 27200710 HC EPIDURAL INFUSION PUMP SET: Performed by: ANESTHESIOLOGY

## 2019-03-24 PROCEDURE — 59400 OBSTETRICAL CARE: CPT | Mod: QY,,, | Performed by: ANESTHESIOLOGY

## 2019-03-24 PROCEDURE — 62326 NJX INTERLAMINAR LMBR/SAC: CPT | Performed by: ANESTHESIOLOGY

## 2019-03-24 RX ORDER — LIDOCAINE HYDROCHLORIDE 10 MG/ML
INJECTION INFILTRATION; PERINEURAL
Status: COMPLETED
Start: 2019-03-24 | End: 2019-03-24

## 2019-03-24 RX ORDER — CARBOPROST TROMETHAMINE 250 UG/ML
INJECTION, SOLUTION INTRAMUSCULAR
Status: DISCONTINUED
Start: 2019-03-24 | End: 2019-03-24 | Stop reason: WASHOUT

## 2019-03-24 RX ORDER — FENTANYL/BUPIVACAINE/NS/PF 2MCG/ML-.1
PLASTIC BAG, INJECTION (ML) INJECTION
Status: DISPENSED
Start: 2019-03-24 | End: 2019-03-25

## 2019-03-24 RX ORDER — NALBUPHINE HYDROCHLORIDE 10 MG/ML
2.5 INJECTION, SOLUTION INTRAMUSCULAR; INTRAVENOUS; SUBCUTANEOUS
Status: DISCONTINUED | OUTPATIENT
Start: 2019-03-24 | End: 2019-03-25

## 2019-03-24 RX ORDER — BUPIVACAINE HYDROCHLORIDE 2.5 MG/ML
INJECTION, SOLUTION EPIDURAL; INFILTRATION; INTRACAUDAL
Status: DISPENSED
Start: 2019-03-24 | End: 2019-03-25

## 2019-03-24 RX ORDER — FENTANYL/BUPIVACAINE/NS/PF 2MCG/ML-.1
PLASTIC BAG, INJECTION (ML) INJECTION CONTINUOUS PRN
Status: DISCONTINUED | OUTPATIENT
Start: 2019-03-24 | End: 2019-03-24

## 2019-03-24 RX ORDER — FENTANYL CITRATE 50 UG/ML
INJECTION, SOLUTION INTRAMUSCULAR; INTRAVENOUS
Status: DISCONTINUED | OUTPATIENT
Start: 2019-03-24 | End: 2019-03-24

## 2019-03-24 RX ORDER — LIDOCAINE HYDROCHLORIDE 10 MG/ML
1 INJECTION INFILTRATION; PERINEURAL ONCE
Status: COMPLETED | OUTPATIENT
Start: 2019-03-24 | End: 2019-03-24

## 2019-03-24 RX ORDER — FENTANYL CITRATE 50 UG/ML
INJECTION, SOLUTION INTRAMUSCULAR; INTRAVENOUS
Status: COMPLETED
Start: 2019-03-24 | End: 2019-03-24

## 2019-03-24 RX ORDER — METHYLERGONOVINE MALEATE 0.2 MG/ML
INJECTION INTRAVENOUS
Status: DISCONTINUED
Start: 2019-03-24 | End: 2019-03-24 | Stop reason: WASHOUT

## 2019-03-24 RX ORDER — LIDOCAINE HYDROCHLORIDE AND EPINEPHRINE 15; 5 MG/ML; UG/ML
INJECTION, SOLUTION EPIDURAL
Status: DISCONTINUED | OUTPATIENT
Start: 2019-03-24 | End: 2019-03-24

## 2019-03-24 RX ADMIN — Medication 5 ML: at 12:03

## 2019-03-24 RX ADMIN — FENTANYL CITRATE 50 MCG: 50 INJECTION, SOLUTION INTRAMUSCULAR; INTRAVENOUS at 12:03

## 2019-03-24 RX ADMIN — LIDOCAINE HYDROCHLORIDE,EPINEPHRINE BITARTRATE 3 ML: 15; .005 INJECTION, SOLUTION EPIDURAL; INFILTRATION; INTRACAUDAL; PERINEURAL at 12:03

## 2019-03-24 RX ADMIN — MISOPROSTOL 600 MCG: 200 TABLET ORAL at 10:03

## 2019-03-24 RX ADMIN — Medication 10 ML/HR: at 12:03

## 2019-03-24 RX ADMIN — LIDOCAINE HYDROCHLORIDE 200 MG: 10 INJECTION, SOLUTION INFILTRATION; PERINEURAL at 10:03

## 2019-03-24 RX ADMIN — LIDOCAINE HYDROCHLORIDE 200 MG: 10 INJECTION INFILTRATION; PERINEURAL at 10:03

## 2019-03-24 NOTE — SUBJECTIVE & OBJECTIVE
Obstetric HPI:  Patient reports Intensity: mild contractions, active fetal movement, No vaginal bleeding , Yes loss of fluid -clear since 0200am    This pregnancy has been complicated by her previous dx of depression./anxiety and asthma.  Both conditions have been well controlled on meds during the pregnancy.        Obstetric History       T0      L0     SAB0   TAB0   Ectopic0   Multiple0   Live Births0       # Outcome Date GA Lbr Gaudencio/2nd Weight Sex Delivery Anes PTL Lv   1 Current                 Past Medical History:   Diagnosis Date    Allergy     Anemia     Asthma     Depression      Past Surgical History:   Procedure Laterality Date    WISDOM TOOTH EXTRACTION         PTA Medications   Medication Sig    albuterol 90 mcg/actuation inhaler Inhale 2 puffs into the lungs every 6 (six) hours as needed for Wheezing.    BOOSTRIX TDAP 2.5-8-5 Lf-mcg-Lf/0.5mL Syrg injection     buPROPion (WELLBUTRIN XL) 300 MG 24 hr tablet TAKE 1 TABLET BY MOUTH ONCE A DAY    FLUZONE QUAD 5325-9770, PF, 60 mcg (15 mcg x 4)/0.5 mL Syrg     prenatal 21/iron fu/folic acid (PRENATAL COMPLETE ORAL) Take by mouth.    SYMBICORT 160-4.5 mcg/actuation HFAA INHALE 2 PUFFS INTO THE LUNGS EVERY 12 HUORS       Review of patient's allergies indicates:   Allergen Reactions    Hay fever and allergy relief Shortness Of Breath     Watery and itchey eyes    Shellfish containing products Hives, Itching, Shortness Of Breath and Swelling    Iodine and iodide containing products         Family History     Problem Relation (Age of Onset)    Breast cancer Maternal Aunt    Other Mother        Tobacco Use    Smoking status: Former Smoker    Smokeless tobacco: Never Used   Substance and Sexual Activity    Alcohol use: Yes     Alcohol/week: 1.2 oz     Types: 2 Glasses of wine per week    Drug use: No    Sexual activity: Yes     Partners: Female     Birth control/protection: None     Review of Systems   Constitutional: Negative for  activity change, appetite change and unexpected weight change.   HENT: Negative.    Respiratory: Negative for shortness of breath.    Cardiovascular: Negative for chest pain and palpitations.   Gastrointestinal: Negative for abdominal pain, diarrhea, nausea and vomiting.   Endocrine: Negative for hair loss, hot flashes, hyperthyroidism and hypothyroidism.   Genitourinary: Negative for decreased libido, dysmenorrhea, frequency, menstrual problem and vaginal discharge.   Musculoskeletal: Negative for back pain.   Integumentary:  Negative for rash, acne and hair changes. Negative.   Neurological: Negative for syncope and headaches.   Hematological: Negative.    Psychiatric/Behavioral: Negative for depression. The patient is not nervous/anxious.    Breast: negative.       Objective:     Vital Signs (Most Recent):  Temp: 97.5 °F (36.4 °C) (03/23/19 2057)  Pulse: 104 (03/23/19 2058)  Resp: 18 (03/23/19 2100)  BP: (!) 147/95 (03/23/19 2057)  SpO2: 99 % (03/23/19 2056) Vital Signs (24h Range):  Temp:  [97.5 °F (36.4 °C)] 97.5 °F (36.4 °C)  Pulse:  [] 104  Resp:  [18] 18  SpO2:  [99 %] 99 %  BP: (147)/(95) 147/95     Weight: 120.7 kg (266 lb)  Body mass index is 40.45 kg/m².    FHT: 125bpm, moderate variability, positive accels, no decels. Cat 1 (reassuring)  TOCO:  Q 8+ minutes, mild to palpation.     Physical Exam:   Constitutional: She is oriented to person, place, and time. She appears well-developed and well-nourished.    HENT:   Head: Normocephalic and atraumatic.    Eyes: Conjunctivae are normal.    Neck: Normal range of motion. Neck supple.    Cardiovascular: Normal rate, regular rhythm and normal heart sounds.     Pulmonary/Chest: Effort normal and breath sounds normal.        Abdominal: Soft.     Genitourinary: Vagina normal and uterus normal.           Musculoskeletal: Normal range of motion and moves all extremeties.       Neurological: She is alert and oriented to person, place, and time.    Skin: Skin is  warm and dry.    Psychiatric: She has a normal mood and affect. Her behavior is normal. Judgment and thought content normal.       Cervix:  Dilation:  1  Effacement:  75%  Station: -3  Presentation: Vertex     Significant Labs:  Lab Results   Component Value Date    GROUPTRH O POS 12/31/2018    HEPBSAG Negative 01/15/2019    STREPBCULT No Group B Streptococcus isolated 02/14/2019       Admission labs are pending and will be reviewed when available.

## 2019-03-24 NOTE — PROGRESS NOTES
"  S:     Jean Carlos is coping well with support; family remains at beside and supportive. SVE requested.    O:   BP (!) 140/79   Pulse 97   Temp 98.5 °F (36.9 °C)   Resp 18   Ht 5' 8" (1.727 m)   Wt 120.7 kg (266 lb)   LMP 2018   SpO2 99%   Breastfeeding? No   BMI 40.45 kg/m²   FHTs: baseline 115-120 positive accels, , moderate adis, positive accels, no decels  [place]: ctns q 3-5 mins, lasting 60 to 70 secs, moderate to strong to palpation, relaxed between  Pitocin at 6mu  /-2  Cephalic presentation  SROM meconium    Meds infusing: pitocin and LR    A:  33 y.o.  IUP at 41w0d  Category 1 tracing  Latent  Labor  Prolonged ROM x 30hrs  Meconium stained fluid-nicu to attend delivery    P:  Continue pitocin titration  Patient tearful about "exhaustion" but denies any needs at this time  Will continue with current plan of care  Anticipate     Nereyda Silva CNM, MSN  2019  8:01 AM        "

## 2019-03-24 NOTE — PROGRESS NOTES
"  S:   Called to bedside patient requests SVE at this time. Patient on pitocin and very hesitant to let nurse increase dosing. Currently increasing q 30 minutes, on 4mu at this time.   Jean Carlos is coping well with support; family (Partner ESHA, and Jose Beasley) remain at beside and supportive, breathing through some contractions, prefers being up out of bed. Reports pain increased about 3min after starting the pitocin.    O:   /82   Pulse 67   Temp 98.3 °F (36.8 °C)   Resp 18   Ht 5' 8" (1.727 m)   Wt 120.7 kg (266 lb)   LMP 2018   SpO2 99%   Breastfeeding? No   BMI 40.45 kg/m²  Pitocin at 4 mu (last turned up about 1 hr ago)  FHTs: baseline 125, moderate adis, positive accels, negative decels  [place]: ctns q 2 to 3 mins, lasting 60 to 90 secs, moderate to palpation, relaxed tone between  SVE 2/90/-2   Cephalic presentation  ROM meconium stained fluid, continues to leak small amt of fluid  Meds infusing: pitocin, LR    Recent Labs   Lab 19  2133   WBC 8.69   RBC 4.10   HGB 11.1*   HCT 33.5*      MCV 82   MCH 27.1   MCHC 33.1         A:  36 y.o.  IUP at 41w1d  Category 1 tracing  latent labor  SROM x 25 hrs--gbs negative, afebrile    P:  Continue pitocin augementation/ titration until regular contractions  Reviewed risks/benefits of increasing pitocin with patient and partner--discussed importance of active labor/ regular strong contractions to encourage continued cervical change.  Continue with current plan of care, anticipate .    Nereyda Silva CNM, MSN  2019  3:01 AM    "

## 2019-03-24 NOTE — HOSPITAL COURSE
2200 Admission to L&D SVE: -/-3, meconium stained fluid (SROM at 0200/ ruptured x 20hrs)  2254 Pitocin started   0230 SVE per patient request -  0730 SVE 3/90/-2   1345 SVE -  1840 SVE 9.5/100/0   Complete and pushing started at   2214

## 2019-03-24 NOTE — ANESTHESIA PROCEDURE NOTES
"Epidural    Patient location during procedure: OB   Reason for block: primary anesthetic   Diagnosis: IUP   Start time: 3/24/2019 12:39 PM  Timeout: 3/24/2019 12:36 PM  End time: 3/24/2019 12:45 PM  Surgery related to: Vaginal Delivery  Staffing  Anesthesiologist: Anatoly Magana MD  Resident/CRNA: Raul Olmstead MD  Performed: resident/CRNA   Preanesthetic Checklist  Completed: patient identified, site marked, surgical consent, pre-op evaluation, timeout performed, IV checked, risks and benefits discussed, monitors and equipment checked, anesthesia consent given, hand hygiene performed and patient being monitored  Preparation  Patient position: sitting  Prep: ChloraPrep  Patient monitoring: Pulse Ox  Epidural  Skin Anesthetic: lidocaine 1%  Skin Wheal: 3 mL  Administration type: continuous  Approach: midline  Interspace: L3-4    Injection technique: ELINOR saline  Needle and Epidural Catheter  Needle type: Tuohy   Needle gauge: 17  Needle length: 3.5 inches  Needle insertion depth: 7 cm  Catheter type: springwTinteo  Catheter size: 19 G  Catheter at skin depth: 12 cm  Test dose: 3 mL of lidocaine 1.5% with Epi 1-to-200,000  Additional Documentation: incremental injection, negative aspiration for heme and CSF, no paresthesia on injection, no signs/symptoms of IV or SA injection, no significant pain on injection and no significant complaints from patient  Needle localization: anatomical landmarks  Medications:  Volume per aspiration: 5 mL  Time between aspirations: 3 minutes  Assessment  Upper dermatomal levels - Left: T6  Right: T6   Dermatomal levels determined by ice  Ease of block: easy  Patient's tolerance of the procedure: comfortable throughout block and no complaintsNo inadvertent dural puncture with Tuohy.  Dural puncture performed with spinal needle (25g 5" [place]).            "

## 2019-03-24 NOTE — ED PROVIDER NOTES
Encounter Date: 3/23/2019       History     Chief Complaint   Patient presents with    Rupture of Membranes    Contractions     Yumiko Valerio is a 33 y.o. female  at 40w6d with Estimated Date of Delivery: 3/17/19 based on 6 week sonogram who presents to L&D c/o srom at 0200 clear/ yellow and contractions that have remained about 8+min apart and not getting stronger since rupture.  She continues to talk and ambulate comfortably through contractions. She states she has rested today as well.  She has had prodromal labor since Tuesday.  She reports + FM.  Denies VB.  Accompanied by ESHA and Jose (Jazmin) Expecting a surprise!         Last ate 6pm (hotdogs) and continues to drink water denies nausea/ vomiting or diarrhea.    Pregnancy has been c/b:    Patient Active Problem List:     Extrinsic asthma without complication     Eczema, allergic     Depression with anxiety     No current facility-administered medications for this encounter.     Current Outpatient Medications:     albuterol 90 mcg/actuation inhaler, Inhale 2 puffs into the lungs every 6 (six) hours as needed for Wheezing., Disp: 18 g, Rfl: 5    buPROPion (WELLBUTRIN XL) 300 MG 24 hr tablet, TAKE 1 TABLET BY MOUTH ONCE A DAY, Disp: 30 tablet, Rfl: 0    prenatal 21/iron fu/folic acid (PRENATAL COMPLETE ORAL), Take by mouth., Disp: , Rfl:     SYMBICORT 160-4.5 mcg/actuation HFAA, INHALE 2 PUFFS INTO THE LUNGS EVERY 12 HUORS, Disp: 10.2 Inhaler, Rfl: 2                    Review of patient's allergies indicates:   Allergen Reactions    Hay fever and allergy relief Shortness Of Breath     Watery and itchey eyes    Shellfish containing products Hives, Itching, Shortness Of Breath and Swelling    Iodine and iodide containing products      Past Medical History:   Diagnosis Date    Allergy     Anemia     Asthma     Depression      Past Surgical History:   Procedure Laterality Date    WISDOM TOOTH EXTRACTION       Family History   Problem Relation  Age of Onset    Breast cancer Maternal Aunt     Other Mother         benign breast lumps removed    Colon cancer Neg Hx     Ovarian cancer Neg Hx      Social History     Tobacco Use    Smoking status: Former Smoker    Smokeless tobacco: Never Used   Substance Use Topics    Alcohol use: Yes     Alcohol/week: 1.2 oz     Types: 2 Glasses of wine per week    Drug use: No     Review of Systems   Constitutional: Negative for fever.   HENT: Negative for sore throat.    Respiratory: Negative for shortness of breath.    Cardiovascular: Negative for chest pain.   Gastrointestinal: Negative for nausea.   Genitourinary: Negative for dysuria.        Leaking fluid since early am (see HPI)  Clear per patient   Musculoskeletal: Negative for back pain.   Skin: Negative for rash.   Neurological: Negative for dizziness, weakness, light-headedness and headaches.   Hematological: Does not bruise/bleed easily.   Psychiatric/Behavioral: Negative for behavioral problems and suicidal ideas. The patient is not nervous/anxious.        Physical Exam     Initial Vitals   BP Pulse Resp Temp SpO2   03/23/19 2057 03/23/19 2056 03/23/19 2100 03/23/19 2057 03/23/19 2056   (!) 147/95 88 18 97.5 °F (36.4 °C) 99 %      MAP       --                Physical Exam    Nursing note and vitals reviewed.  Constitutional: She appears well-developed and well-nourished.   HENT:   Head: Normocephalic and atraumatic.   Eyes: Conjunctivae are normal.   Neck: Normal range of motion. Neck supple.   Cardiovascular: Normal rate, regular rhythm and normal heart sounds.   Pulmonary/Chest: Breath sounds normal.   Abdominal: Soft. Bowel sounds are normal.   Genitourinary: Vagina normal and uterus normal.   Genitourinary Comments: Perineum moist externally with leaking fluid noted.  Suspect light meconium in fluid-light green noted on towel.   Musculoskeletal: Normal range of motion.   Neurological: She is alert and oriented to person, place, and time. She has normal  strength.   Skin: Skin is warm and dry.   Psychiatric: She has a normal mood and affect. Her behavior is normal. Judgment and thought content normal.         ED Course   Fetal non-stress test  Date/Time: 3/23/2019 9:37 PM  Performed by: Nereyda Silva CNM  Authorized by: Crista Garcia MD     Nonstress Test:     Variability:  >25 BPM    Decelerations:  None    Accelerations:  15 bpm    Acoustic Stimulator: No      Uterine Irritability: No      Contractions:  Regular    Contraction Frequency:  8+ minutes  Biophysical Profile:     Nonstress Test Interpretation: reactive      Overall Impression:  Reassuring      Labs Reviewed - No data to display       Imaging Results    None          Medical Decision Making:   History:   Old Records Summarized: records from clinic visits.  Initial Assessment:   PROM/ latent labor  ED Management:  Dr. Garcia notified of patient status.   Patient to be admitted to labor.    Other:   I discussed test(s) with the performing physician.       <> Summary of the Findings: Reactive NST                      Clinical Impression:       ICD-10-CM ICD-9-CM   1. Full-term premature rupture of membranes with onset of labor more than 24 hours following rupture O42.12 658.20   2. Thin meconium stained amniotic fluid P96.83 779.84                                Nereyda Silva CNM  03/23/19 2143       Nereyda Silva CNM  03/23/19 2144

## 2019-03-24 NOTE — PROGRESS NOTES
"s)Patient requests to get in tub, asks questions about epidural.  She states she will likely request an epidural as she "does not think she can do this all day"  +fm, denies vaginal bleeding, continues to void without difficulty, breathing through contractions sitting on ball    O) BP (!) 135/92   Pulse 97   Temp 98.5 °F (36.9 °C)   Resp 18   Ht 5' 8" (1.727 m)   Wt 120.7 kg (266 lb)   LMP 2018   SpO2 99%   Breastfeeding? No   BMI 40.45 kg/m²      SVE deferred  -125, positive accels, no decels, moderate variability  Contractions q 3-6, mod to palpation  Pitocin remains at 6mu    A) latent labor  gbs negative  Prolonged ROM, afebrile    P) patient now in tub, with family at bedside  Discussed r/b/a to epidural anesthesia and advised to notify myself or RN if desired  Anticipate     Nereyda Silva CNM, MSN  2019  8:44 AM        "

## 2019-03-24 NOTE — HPI
Yumiko Valerio is a 33 y.o. female  at 40w6d with Estimated Date of Delivery: 3/17/19 based on 6 week sonogram who presents to L&D c/o srom at 0200 clear/ yellow and contractions that have remained about 8+min apart and not getting stronger since rupture.  She continues to talk and ambulate comfortably through contractions. She states she has rested today as well.  She has had prodromal labor since Tuesday.  She reports + FM.  Denies VB.  Accompanied by ESHA and Jose (Jazmin) Expecting a surprise!     Last ate 6pm (hotdogs) and continues to drink water denies nausea/ vomiting or diarrhea.     Pregnancy has been c/b:     Extrinsic asthma without complication     Eczema, allergic     Depression with anxiety

## 2019-03-24 NOTE — PROGRESS NOTES
"  S:   Getting comfortable with epidural, no complaints; family remains at bedside and supportive  Still feeling contractions but states that she is getting more comfortable with each one. Family just re-entered the room post epidural placement. Patient is tearful and exhausted, needing some rest at this time.     O:   /65   Pulse 97   Temp 98.8 °F (37.1 °C) (Oral)   Resp 18   Ht 5' 8" (1.727 m)   Wt 120.7 kg (266 lb)   LMP 2018   SpO2 99%   Breastfeeding? No   BMI 40.45 kg/m²   FHTs: baseline 125, moderate adis, positive accels, negative decels  [place]: ctns q 3 to 5 mins, lasting 60 to 70 secs, mod to palpation, relaxed between  SVE 5/90/-2 to -1   Cephalic presentation  ROM meconium stained x 36hrs  Meds infusing: pitocin and LR    A:  33 y.o.  IUP at 41w0d  Category 1 tracing  latent labor  SROM light meconium stained fluid x 36 hrs    P:  Continue pitocin titration to more regular contraction pattern  Discussed with Jean Carlos and her partner that peds will likely consider doing bloodwork on baby Nayan due to prolonged rupture of membranes.   Will update MD team.   Anticipate BIJAN Silva CNM, MSN  2019  1:50 PM      "

## 2019-03-24 NOTE — ANESTHESIA PREPROCEDURE EVALUATION
Yumiko Valerio is a 33 y.o. female with IUP at 41/0 who presented with SROM. MW patient now desirous of epidural.     OB History    Para Term  AB Living   1 0 0 0 0 0   SAB TAB Ectopic Multiple Live Births   0 0 0 0        # Outcome Date GA Lbr Gaudencio/2nd Weight Sex Delivery Anes PTL Lv   1 Current                Wt Readings from Last 1 Encounters:   19 2136 120.7 kg (266 lb)       BP Readings from Last 3 Encounters:   19 135/65   19 110/80   19 120/76       Patient Active Problem List   Diagnosis    Extrinsic asthma without complication    Eczema, allergic    Depression with anxiety    Prenatal care in third trimester    Thin meconium stained amniotic fluid    Full-term premature rupture of membranes with onset of labor more than 24 hours following rupture       Past Surgical History:   Procedure Laterality Date    WISDOM TOOTH EXTRACTION         Social History     Socioeconomic History    Marital status:      Spouse name: Not on file    Number of children: Not on file    Years of education: Not on file    Highest education level: Not on file   Occupational History    Not on file   Social Needs    Financial resource strain: Not on file    Food insecurity:     Worry: Not on file     Inability: Not on file    Transportation needs:     Medical: Not on file     Non-medical: Not on file   Tobacco Use    Smoking status: Former Smoker    Smokeless tobacco: Never Used   Substance and Sexual Activity    Alcohol use: Yes     Alcohol/week: 1.2 oz     Types: 2 Glasses of wine per week    Drug use: No    Sexual activity: Yes     Partners: Female     Birth control/protection: None   Lifestyle    Physical activity:     Days per week: Not on file     Minutes per session: Not on file    Stress: Not on file   Relationships    Social connections:     Talks on phone: Not on file     Gets together: Not on file     Attends Evangelical service: Not on file     Active member  of club or organization: Not on file     Attends meetings of clubs or organizations: Not on file     Relationship status: Not on file    Intimate partner violence:     Fear of current or ex partner: Not on file     Emotionally abused: Not on file     Physically abused: Not on file     Forced sexual activity: Not on file   Other Topics Concern    Not on file   Social History Narrative    Not on file         Chemistry        Component Value Date/Time     (L) 01/15/2019 1052    K 4.1 01/15/2019 1052     01/15/2019 1052    CO2 21 (L) 01/15/2019 1052    BUN 9 01/15/2019 1052    CREATININE 0.7 01/15/2019 1052    GLU 68 (L) 01/15/2019 1052        Component Value Date/Time    CALCIUM 9.3 01/15/2019 1052    ALKPHOS 67 05/31/2017 1055    AST 17 05/31/2017 1055    ALT 12 05/31/2017 1055    BILITOT 0.3 05/31/2017 1055    ESTGFRAFRICA >60 01/15/2019 1052    EGFRNONAA >60 01/15/2019 1052            Lab Results   Component Value Date    WBC 8.69 03/23/2019    HGB 11.1 (L) 03/23/2019    HCT 33.5 (L) 03/23/2019    MCV 82 03/23/2019     03/23/2019       No results for input(s): PT, INR, PROTIME, APTT in the last 72 hours.                  Anesthesia Evaluation    I have reviewed the Patient Summary Reports.     I have reviewed the Medications.     Review of Systems  Anesthesia Hx:  No previous Anesthesia  Neg history of prior surgery. Denies Family Hx of Anesthesia complications.    Cardiovascular:  Cardiovascular Normal     Pulmonary:   Asthma mild    Renal/:  Renal/ Normal     Hepatic/GI:   Hiatal Hernia,    Musculoskeletal:  Musculoskeletal Normal    Dermatological:   Eczema   Psych:   depression          Physical Exam  General:  Well nourished, Obesity    Airway/Jaw/Neck:  Airway Findings: Mouth Opening: Normal Tongue: Normal  General Airway Assessment: Adult  Mallampati: III  Improves to II with phonation.  TM Distance: Normal, at least 6 cm  Jaw/Neck Findings:  Neck ROM: Normal ROM      Dental:  Dental  Findings: In tact   Chest/Lungs:  Chest/Lungs Findings: Clear to auscultation     Heart/Vascular:  Heart Findings: Rate: Normal  Rhythm: Regular Rhythm  Sounds: Normal  Heart murmur: negative       Mental Status:  Mental Status Findings:  Alert and Oriented, Cooperative         Anesthesia Plan  Type of Anesthesia, risks & benefits discussed:  Anesthesia Type:  CSE, epidural, general, spinal  Patient's Preference:   Intra-op Monitoring Plan:   Intra-op Monitoring Plan Comments:   Post Op Pain Control Plan:   Post Op Pain Control Plan Comments:   Induction:    Beta Blocker:  Patient is not currently on a Beta-Blocker (No further documentation required).       Informed Consent: Patient understands risks and agrees with Anesthesia plan.  Questions answered. Anesthesia consent signed with patient.  ASA Score: 2     Day of Surgery Review of History & Physical:    H&P update referred to the surgeon.         Ready For Surgery From Anesthesia Perspective.

## 2019-03-24 NOTE — H&P
[facility]  Obstetrics  History & Physical    Patient Name: Yumiko Valerio  MRN: [id number]  Admission Date: 3/23/2019  Primary Care Provider: Donna Lantigua MD    Subjective:     Principal Problem:Full-term premature rupture of membranes with onset of labor more than 24 hours following rupture    History of Present Illness:    Yumiko Valerio is a 33 y.o. female  at 40w6d with Estimated Date of Delivery: 3/17/19 based on 6 week sonogram who presents to L&D c/o srom at 0200 clear/ yellow and contractions that have remained about 8+min apart and not getting stronger since rupture.  She continues to talk and ambulate comfortably through contractions. She states she has rested today as well.  She has had prodromal labor since Tuesday.  She reports + FM.  Denies VB.  Accompanied by ESHA and  (Jazmin) Expecting a surprise!     Last ate 6pm (hotdogs) and continues to drink water denies nausea/ vomiting or diarrhea.     Pregnancy has been c/b:     Extrinsic asthma without complication     Eczema, allergic     Depression with anxiety        Obstetric HPI:  Patient reports Intensity: mild contractions, active fetal movement, No vaginal bleeding , Yes loss of fluid -clear since 0200am    This pregnancy has been complicated by her previous dx of depression./anxiety and asthma.  Both conditions have been well controlled on meds during the pregnancy.        Obstetric History       T0      L0     SAB0   TAB0   Ectopic0   Multiple0   Live Births0       # Outcome Date GA Lbr Gaudencio/2nd Weight Sex Delivery Anes PTL Lv   1 Current                 Past Medical History:   Diagnosis Date    Allergy     Anemia     Asthma     Depression      Past Surgical History:   Procedure Laterality Date    WISDOM TOOTH EXTRACTION         PTA Medications   Medication Sig    albuterol 90 mcg/actuation inhaler Inhale 2 puffs into the lungs every 6 (six) hours as needed for Wheezing.    BOOSTRIX TDAP 2.5-8-5  Lf-mcg-Lf/0.5mL Syrg injection     buPROPion (WELLBUTRIN XL) 300 MG 24 hr tablet TAKE 1 TABLET BY MOUTH ONCE A DAY    FLUZONE QUAD 2321-6207, PF, 60 mcg (15 mcg x 4)/0.5 mL Syrg     prenatal 21/iron fu/folic acid (PRENATAL COMPLETE ORAL) Take by mouth.    SYMBICORT 160-4.5 mcg/actuation HFAA INHALE 2 PUFFS INTO THE LUNGS EVERY 12 HUORS       Review of patient's allergies indicates:   Allergen Reactions    Hay fever and allergy relief Shortness Of Breath     Watery and itchey eyes    Shellfish containing products Hives, Itching, Shortness Of Breath and Swelling    Iodine and iodide containing products         Family History     Problem Relation (Age of Onset)    Breast cancer Maternal Aunt    Other Mother        Tobacco Use    Smoking status: Former Smoker    Smokeless tobacco: Never Used   Substance and Sexual Activity    Alcohol use: Yes     Alcohol/week: 1.2 oz     Types: 2 Glasses of wine per week    Drug use: No    Sexual activity: Yes     Partners: Female     Birth control/protection: None     Review of Systems   Constitutional: Negative for activity change, appetite change and unexpected weight change.   HENT: Negative.    Respiratory: Negative for shortness of breath.    Cardiovascular: Negative for chest pain and palpitations.   Gastrointestinal: Negative for abdominal pain, diarrhea, nausea and vomiting.   Endocrine: Negative for hair loss, hot flashes, hyperthyroidism and hypothyroidism.   Genitourinary: Negative for decreased libido, dysmenorrhea, frequency, menstrual problem and vaginal discharge.   Musculoskeletal: Negative for back pain.   Integumentary:  Negative for rash, acne and hair changes. Negative.   Neurological: Negative for syncope and headaches.   Hematological: Negative.    Psychiatric/Behavioral: Negative for depression. The patient is not nervous/anxious.    Breast: negative.       Objective:     Vital Signs (Most Recent):  Temp: 97.5 °F (36.4 °C) (03/23/19 2057)  Pulse: 104  (19)  Resp: 18 (19)  BP: (!) 147/95 (19)  SpO2: 99 % (19) Vital Signs (24h Range):  Temp:  [97.5 °F (36.4 °C)] 97.5 °F (36.4 °C)  Pulse:  [] 104  Resp:  [18] 18  SpO2:  [99 %] 99 %  BP: (147)/(95) 147/95     Weight: 120.7 kg (266 lb)  Body mass index is 40.45 kg/m².    FHT: 125bpm, moderate variability, positive accels, no decels. Cat 1 (reassuring)  TOCO:  Q 8+ minutes, mild to palpation.     Physical Exam:   Constitutional: She is oriented to person, place, and time. She appears well-developed and well-nourished.    HENT:   Head: Normocephalic and atraumatic.    Eyes: Conjunctivae are normal.    Neck: Normal range of motion. Neck supple.    Cardiovascular: Normal rate, regular rhythm and normal heart sounds.     Pulmonary/Chest: Effort normal and breath sounds normal.        Abdominal: Soft.     Genitourinary: Vagina normal and uterus normal.           Musculoskeletal: Normal range of motion and moves all extremeties.       Neurological: She is alert and oriented to person, place, and time.    Skin: Skin is warm and dry.    Psychiatric: She has a normal mood and affect. Her behavior is normal. Judgment and thought content normal.       Cervix:  Dilation:  1  Effacement:  75%  Station: -3  Presentation: Vertex     Significant Labs:  Lab Results   Component Value Date    GROUPTRH O POS 2018    HEPBSAG Negative 01/15/2019    STREPBCULT No Group B Streptococcus isolated 2019       Admission labs are pending and will be reviewed when available.       Assessment/Plan:     33 y.o. female  at 40w6d for:  Patient Active Problem List   Diagnosis    Extrinsic asthma without complication    Eczema, allergic    Depression with anxiety    Prenatal care in third trimester    Thin meconium stained amniotic fluid    Full-term premature rupture of membranes with onset of labor more than 24 hours following rupture     Plan: Discussed augmentation of labor  "with Jean Carlos and her partner.  She is open to pitocin augmentation at this time and wants to titrate it "slowly".  Ordered for increasing q30min.    Plans an unmedicated birth, discussed NICU attendance at birth due to meconium stained fluid, and will continuously monitor fetus with portable monitors to allow movement.    Recommend rest at this time until more active labor starts.   Anticipate   MD team updated on patient status (Haylee Wallace is attending MD at this time)    No notes have been filed under this hospital service.  Service: Obstetrics and Gynecology      Nereyda Silva CNM  Obstetrics  [facility]      "

## 2019-03-25 LAB
BASOPHILS # BLD AUTO: 0.02 K/UL (ref 0–0.2)
BASOPHILS NFR BLD: 0.1 % (ref 0–1.9)
DIFFERENTIAL METHOD: ABNORMAL
EOSINOPHIL # BLD AUTO: 0.1 K/UL (ref 0–0.5)
EOSINOPHIL NFR BLD: 0.5 % (ref 0–8)
ERYTHROCYTE [DISTWIDTH] IN BLOOD BY AUTOMATED COUNT: 14.8 % (ref 11.5–14.5)
HCT VFR BLD AUTO: 28.9 % (ref 37–48.5)
HGB BLD-MCNC: 9.4 G/DL (ref 12–16)
LYMPHOCYTES # BLD AUTO: 1.3 K/UL (ref 1–4.8)
LYMPHOCYTES NFR BLD: 8.7 % (ref 18–48)
MCH RBC QN AUTO: 26.6 PG (ref 27–31)
MCHC RBC AUTO-ENTMCNC: 32.5 G/DL (ref 32–36)
MCV RBC AUTO: 82 FL (ref 82–98)
MONOCYTES # BLD AUTO: 1.3 K/UL (ref 0.3–1)
MONOCYTES NFR BLD: 8.2 % (ref 4–15)
NEUTROPHILS # BLD AUTO: 12.5 K/UL (ref 1.8–7.7)
NEUTROPHILS NFR BLD: 82 % (ref 38–73)
PLATELET # BLD AUTO: 237 K/UL (ref 150–350)
PMV BLD AUTO: 10.8 FL (ref 9.2–12.9)
RBC # BLD AUTO: 3.54 M/UL (ref 4–5.4)
WBC # BLD AUTO: 15.26 K/UL (ref 3.9–12.7)

## 2019-03-25 PROCEDURE — 36415 COLL VENOUS BLD VENIPUNCTURE: CPT

## 2019-03-25 PROCEDURE — 11000001 HC ACUTE MED/SURG PRIVATE ROOM

## 2019-03-25 PROCEDURE — 25000003 PHARM REV CODE 250: Performed by: ADVANCED PRACTICE MIDWIFE

## 2019-03-25 PROCEDURE — 85025 COMPLETE CBC W/AUTO DIFF WBC: CPT

## 2019-03-25 RX ORDER — OXYTOCIN/RINGER'S LACTATE 20/1000 ML
41.65 PLASTIC BAG, INJECTION (ML) INTRAVENOUS CONTINUOUS
Status: ACTIVE | OUTPATIENT
Start: 2019-03-25 | End: 2019-03-25

## 2019-03-25 RX ORDER — DIPHENHYDRAMINE HYDROCHLORIDE 50 MG/ML
25 INJECTION INTRAMUSCULAR; INTRAVENOUS EVERY 4 HOURS PRN
Status: DISCONTINUED | OUTPATIENT
Start: 2019-03-25 | End: 2019-03-26 | Stop reason: HOSPADM

## 2019-03-25 RX ORDER — ACETAMINOPHEN 325 MG/1
650 TABLET ORAL EVERY 6 HOURS PRN
Status: DISCONTINUED | OUTPATIENT
Start: 2019-03-25 | End: 2019-03-26 | Stop reason: HOSPADM

## 2019-03-25 RX ORDER — DOCUSATE SODIUM 100 MG/1
200 CAPSULE, LIQUID FILLED ORAL 2 TIMES DAILY PRN
Status: DISCONTINUED | OUTPATIENT
Start: 2019-03-25 | End: 2019-03-26 | Stop reason: HOSPADM

## 2019-03-25 RX ORDER — PRENATAL WITH FERROUS FUM AND FOLIC ACID 3080; 920; 120; 400; 22; 1.84; 3; 20; 10; 1; 12; 200; 27; 25; 2 [IU]/1; [IU]/1; MG/1; [IU]/1; MG/1; MG/1; MG/1; MG/1; MG/1; MG/1; UG/1; MG/1; MG/1; MG/1; MG/1
1 TABLET ORAL DAILY
Status: COMPLETED | OUTPATIENT
Start: 2019-03-25 | End: 2019-03-26

## 2019-03-25 RX ORDER — ONDANSETRON 8 MG/1
8 TABLET, ORALLY DISINTEGRATING ORAL EVERY 8 HOURS PRN
Status: DISCONTINUED | OUTPATIENT
Start: 2019-03-25 | End: 2019-03-26 | Stop reason: HOSPADM

## 2019-03-25 RX ORDER — FAMOTIDINE 10 MG/ML
20 INJECTION INTRAVENOUS ONCE
Status: DISCONTINUED | OUTPATIENT
Start: 2019-03-25 | End: 2019-03-26 | Stop reason: HOSPADM

## 2019-03-25 RX ORDER — HYDROCORTISONE 25 MG/G
CREAM TOPICAL 3 TIMES DAILY PRN
Status: DISCONTINUED | OUTPATIENT
Start: 2019-03-25 | End: 2019-03-26 | Stop reason: HOSPADM

## 2019-03-25 RX ORDER — DIPHENHYDRAMINE HCL 25 MG
25 CAPSULE ORAL EVERY 4 HOURS PRN
Status: DISCONTINUED | OUTPATIENT
Start: 2019-03-25 | End: 2019-03-26 | Stop reason: HOSPADM

## 2019-03-25 RX ORDER — IBUPROFEN 600 MG/1
600 TABLET ORAL EVERY 6 HOURS PRN
Status: DISCONTINUED | OUTPATIENT
Start: 2019-03-25 | End: 2019-03-26 | Stop reason: HOSPADM

## 2019-03-25 RX ORDER — BUPROPION HYDROCHLORIDE 150 MG/1
300 TABLET ORAL DAILY
Status: DISCONTINUED | OUTPATIENT
Start: 2019-03-25 | End: 2019-03-26 | Stop reason: HOSPADM

## 2019-03-25 RX ORDER — SODIUM CITRATE AND CITRIC ACID MONOHYDRATE 334; 500 MG/5ML; MG/5ML
30 SOLUTION ORAL ONCE
Status: DISCONTINUED | OUTPATIENT
Start: 2019-03-25 | End: 2019-03-26 | Stop reason: HOSPADM

## 2019-03-25 RX ADMIN — IBUPROFEN 600 MG: 600 TABLET ORAL at 06:03

## 2019-03-25 RX ADMIN — BUPROPION HYDROCHLORIDE 300 MG: 150 TABLET, FILM COATED, EXTENDED RELEASE ORAL at 09:03

## 2019-03-25 RX ADMIN — IBUPROFEN 600 MG: 600 TABLET ORAL at 12:03

## 2019-03-25 RX ADMIN — DOCUSATE SODIUM 200 MG: 100 CAPSULE, LIQUID FILLED ORAL at 10:03

## 2019-03-25 RX ADMIN — PRENATAL VIT W/ FE FUMARATE-FA TAB 27-0.8 MG 1 TABLET: 27-0.8 TAB at 09:03

## 2019-03-25 RX ADMIN — DOCUSATE SODIUM 200 MG: 100 CAPSULE, LIQUID FILLED ORAL at 09:03

## 2019-03-25 NOTE — PROGRESS NOTES
"  S:   Jean Carlos is comfortable with epidural, reports some mild pressure in pelvis in the "front" more than in rectal are with contractions. Denies pain.  ESHA and her  Jazmin remain at bedside and supportive.  Patient is requesting SVE. Denies visual changes or headaches or epigastric pain.  Patient has had an occasional elevation of BP (the majority are normal as we have continued surveillance); if continued elevated bp will draw Pre-e labs.      O:   BP (!) 150/72   Pulse 90   Temp 97.9 °F (36.6 °C) (Oral)   Resp 18   Ht 5' 8" (1.727 m)   Wt 120.7 kg (266 lb)   LMP 2018   SpO2 100%   Breastfeeding? No   BMI 40.45 kg/m²      FHTs: baseline 115-120, moderate adis, positive accels, no decels  [place]: ctns q 2 to 3 mins, lasting 60 to 70 secs, mod-strong to palpation, relaxed tone between  SVE 9.5cm/c/0  OA presentation suspected with no caput at this point  ROM minimal fluid noted at this point, NICU planned for delivery due to light meconium stained fluid noted earlier  Meds infusing: Pitocin at 20mu, LR at KVO    A:  33 y.o.  IUP at 41w0d  Category 1 tracing  Active labor, near complete stage 1 of labor  SROM x 41 hrs    P:  Continue with current plan of care  MD team updated and pleased with patient progress and current status  Jean Carlos and her partner understand plan for NICU at delivery and meconium stained fluid protocols  Recheck in 1 hour or with persistent rectal pressure and begin pushing  Reviewed risks/benefits of laboring down for short period of time once complete  Nereyda Silva CNM, MSN  2019  7:11 PM          "

## 2019-03-25 NOTE — PLAN OF CARE
Problem: Breastfeeding  Goal: Effective Breastfeeding  Outcome: Ongoing (interventions implemented as appropriate)   Encouraged nursing infant at least 8 times in 24 hours on cue until content. Discouraged bottles and pacifiers and risks of both discussed as well as benefits of breastfeeding.

## 2019-03-25 NOTE — DISCHARGE INSTRUCTIONS
Breastfeeding Discharge Instructions       Feed the baby at the earliest sign of hunger or comfort  o Hands to mouth, sucking motions  o Rooting or searching for something to suck on  o Dont wait for crying - it is a sign of distress     The feedings may be 8-12 times per 24hrs and will not follow a schedule   Avoid pacifiers and bottles for the first 4 weeks   Alternate the breast you start the feeding with, or start with the breast that feels the fullest   Switch breasts when the baby takes himself off the breast or falls asleep   Keep offering breasts until the baby looks full, no longer gives hunger signs, and stays asleep when placed on his back in the crib   If the baby is sleepy and wont wake for a feeding, put the baby skin-to-skin dressed in a diaper against the mothers bare chest   Sleep near your baby   The baby should be positioned and latched on to the breast correctly  o Chest-to-chest, chin in the breast  o Babys lips are flipped outward  o Babys mouth is stretched open wide like a shout  o Babys sucking should feel like tugging to the mother  - The baby should be drinking at the breast:  o You should hear swallowing or gulping throughout the feeding  o You should see milk on the babys lips when he comes off the breast  o Your breasts should be softer when the baby is finished feeding  o The baby should look relaxed at the end of feedings  o After the 4th day and your milk is in:  o The babys poop should turn bright yellow and be loose, watery, and seedy  o The baby should have at least 3-4 poops the size of the palm of your hand per day  o The baby should have at least 5-6 wet diapers per day  o The urine should be light yellow in color  You should drink when you are thirsty and eat a healthy diet when you are    hungry.     Take naps to get the rest you need.   Take medications and/or drink alcohol only with permission of your obstetrician    or the babys pediatrician.  You can  also call the Infant Risk Center,   ([phone]), Monday-Friday, 8am-5pm Central time, to get the most   up-to-date evidence-based information on the use of medications during   pregnancy and breastfeeding.      The baby should be examined by a pediatrician at 3-5 days of age.  Once your   milk comes in, the baby should be gaining at least ½ - 1oz each day and should be back to birthweight no later than 10-14 days of age.          Community Resources    [facility] Breastfeeding Warmline: [phone]   Local [facility] clinics: provide incentives and breastpumps to eligible mothers  La Leche [employer] (LLLI):  mother-to-mother support group website        www.SkillsTrakl.[employer] Systems  Local La Leche League mother-to-mother support groups:        www.Gnip        La Leche League [facility]   Dr. yKler Ramirez website for latch videos and general information:        www.breastfeedinginc.ca  Infant Risk Center is a call center that provides information about the safety of taking medications while breastfeeding.  Call 1-546.820.6834, M-F, 8am-5pm, CT.  International Lactation Consultant Association provides resources for assistance:        www.ilca.org  [place] Breastfeeding Coalition provides informationand resources for parents  and the community    www.LaBreastfeedingSupport.org     Mare Banegas is a mom-to-mom support group:                             www.cicaydacaitIntercommunity Cancer Centers of America.com//breastfeedng-support/  Partners for Healthy Babies:  3-018-858-BABY(3929)  C[employer]ait: a breastfeeding support group for women of color, [phone]

## 2019-03-25 NOTE — PROGRESS NOTES
Doing well, ambulating, voiding, and tolerating regular diet  Lochia: steadily decreasing  Pain: well controlled with po meds  Breasts/nipples: breast feeding well without difficulty  Depression/anxiety: denies   Support at home: excellent - partner ESHA has 6 weeks off to support and both have very supportive families with rich understanding of what is needed during PP period per pt  [place]: Nayan is doing well, will f/u with pediatrician     Gen: A&O x 4, NAD  CV: normal HR  Lungs: normal resp effort  Breasts: bilaterally soft, non-tender, nipples intact  Abdomen: soft, non-tender, uterus firm at U -1 fb  Perineum: approximated, no edema   Lochia: minimal rubra  Ext: bilaterally no pedal edema without signs of DVT    A: PP day 1 - pt nursing and healing well     P: Plan to observe x 48 hours per peds and d/c 3/27/2019     Nirmala Box CNM

## 2019-03-25 NOTE — ANESTHESIA POSTPROCEDURE EVALUATION
Anesthesia Post Evaluation    Patient: Yumiko Valerio    Procedure(s) Performed: * No procedures listed *    Final Anesthesia Type: epidural  Patient location during evaluation: floor  Patient participation: Yes- Able to Participate  Level of consciousness: awake and alert  Post-procedure vital signs: reviewed and stable  Pain management: adequate  Airway patency: patent  PONV status at discharge: No PONV  Anesthetic complications: no      Cardiovascular status: blood pressure returned to baseline  Respiratory status: unassisted, spontaneous ventilation and room air  Hydration status: euvolemic  Follow-up not needed.          Vitals Value Taken Time   BP  3/25/2019  1:47 PM   Temp  3/25/2019  1:47 PM   Pulse 113 3/24/2019 10:11 PM   Resp  3/25/2019  1:47 PM   SpO2 91 % 3/24/2019 10:11 PM   Vitals shown include unvalidated device data.      No case tracking events are documented in the log.      Pain/Chance Score: Pain Rating Prior to Med Admin: 2 (3/25/2019 12:07 PM)  Pain Rating Post Med Admin: 1 (3/25/2019  6:41 AM)

## 2019-03-25 NOTE — LACTATION NOTE
03/25/19 1230   Maternal Assessment   Breast Shape Bilateral:;pendulous   Breast Density Bilateral:;soft   Areola Bilateral:;elastic   Nipples Bilateral:;everted   Maternal Infant Feeding   Maternal Emotional State assist needed;relaxed   Infant Positioning cross-cradle   Signs of Milk Transfer infant jaw motion present   Pain with Feeding no   Latch Assistance yes   Lactation note:  Reviewed basic breastfeeding education with mother of infant using the breastfeeding guide. Mother able to latch her infant to the breast with some assistance and infant nursing effectively with breast compression/stimulation. Encouraged nursing infant at least 8 times in 24 hours on cue until content. Discouraged bottles and pacifiers and risks of both discussed as well as benefits of breastfeeding. LC phone number placed on board for further questions or assistance as needed.

## 2019-03-26 VITALS
OXYGEN SATURATION: 98 % | BODY MASS INDEX: 40.32 KG/M2 | HEART RATE: 88 BPM | RESPIRATION RATE: 18 BRPM | WEIGHT: 266 LBS | TEMPERATURE: 98 F | HEIGHT: 68 IN | SYSTOLIC BLOOD PRESSURE: 127 MMHG | DIASTOLIC BLOOD PRESSURE: 84 MMHG

## 2019-03-26 PROBLEM — Z34.93 PRENATAL CARE IN THIRD TRIMESTER: Status: RESOLVED | Noted: 2019-02-14 | Resolved: 2019-03-26

## 2019-03-26 PROCEDURE — 25000003 PHARM REV CODE 250: Performed by: ADVANCED PRACTICE MIDWIFE

## 2019-03-26 RX ADMIN — DOCUSATE SODIUM 200 MG: 100 CAPSULE, LIQUID FILLED ORAL at 10:03

## 2019-03-26 RX ADMIN — IBUPROFEN 600 MG: 600 TABLET ORAL at 05:03

## 2019-03-26 RX ADMIN — BUPROPION HYDROCHLORIDE 300 MG: 150 TABLET, FILM COATED, EXTENDED RELEASE ORAL at 10:03

## 2019-03-26 RX ADMIN — IBUPROFEN 600 MG: 600 TABLET ORAL at 12:03

## 2019-03-26 RX ADMIN — PRENATAL VIT W/ FE FUMARATE-FA TAB 27-0.8 MG 1 TABLET: 27-0.8 TAB at 10:03

## 2019-03-26 NOTE — PLAN OF CARE
Problem: Breastfeeding  Goal: Effective Breastfeeding  Outcome: Ongoing (interventions implemented as appropriate)  Follow discharge education.

## 2019-03-26 NOTE — DISCHARGE SUMMARY
[facility]  Delivery Discharge Summary  Obstetrics    Admit Date: 3/23/2019    Discharge Date and Time:  2019 10:40 AM    Primary OB Clinician: MODE Vega    Attending Physician: Concepción Tesfaye MD     Discharge Provider: Lisa Kessler    Reason for Admission:     Estimated Date of Delivery: 3/17/19     Conditions: PROM    Procedures Performed: * No surgery found *    Hospital Course (synopsis of major diagnoses, care, treatment, and services provided during the course of the hospital stay):    Yumiko Valerio is a 33 y.o. now , PPD #1 who was admitted on 3/23/2019  for normal spontaneous vaginal delivery. On initial assessment, vital signs were stable and physical exam was normal. Infant was in cephalic presentation. Patient was subsequently admitted to labor and delivery unit with signed consents. Labor course was managed with epidural.  Patient delivered a single viable  female. Pt was in stable condition post-delivery and was transferred to the Mother-Baby Unit. Her postpartum course was uncomplicated. On discharge day, patient's pain is controlled with oral pain medications. Patient is tolerating PO without nausea or vomiting, ambulating, voiding. She denies SOB and CP. Denies any HA, vision changes, F/C, LE swelling. Denies any breast pain/soreness.     Delivery:    Episiotomy: None   Lacerations: 1st   Repair suture:     Repair # of packets: 1   Blood loss (ml): 400     Birth information:  YOB: 2018   Time of birth: 10:14 PM   Sex: female   Delivery type: Vaginal, Spontaneous   Gestational Age: 41w0d    Delivery Clinician:      Other providers:       Additional  information:  Forceps:    Vacuum:    Breech:    Observed anomalies      Living?:           APGARS  One minute Five minutes Ten minutes   Skin color:         Heart rate:         Grimace:         Muscle tone:         Breathing:         Totals: 9  9        Placenta:  Delivered:       appearance    Tubal Ligation: n/a    Feeding Method: breast    Rh Immune Globulin Given: no    Rubella Vaccine Given: no    Tdap Vaccine Given: no    Consults: none    Significant Diagnostic Studies: Labs: All labs within the past 24 hours have been reviewed    Final Diagnoses:   Principal Problem: Normal vaginal delivery   Secondary Diagnoses:   Active Hospital Problems    Diagnosis  POA    *Normal vaginal delivery [O80]  Not Applicable    Thin meconium stained amniotic fluid [P96.83]  Yes    Full-term premature rupture of membranes with onset of labor more than 24 hours following rupture [O42.12]  Yes      Resolved Hospital Problems   No resolved problems to display.       Discharged Condition: good    Disposition: Home or Self Care    Follow Up/Patient Instructions:     Medications:  Reconciled Home Medications:      Medication List      ASK your doctor about these medications    albuterol 90 mcg/actuation inhaler  Commonly known as:  PROVENTIL/VENTOLIN HFA  Inhale 2 puffs into the lungs every 6 (six) hours as needed for Wheezing.     BOOSTRIX TDAP 2.5-8-5 Lf-mcg-Lf/0.5mL Syrg injection  Generic drug:  diphth,pertus(acell),tetanus     buPROPion 300 MG 24 hr tablet  Commonly known as:  WELLBUTRIN XL  TAKE 1 TABLET BY MOUTH ONCE A DAY     FLUZONE QUAD 3843-6859 (PF) 60 mcg (15 mcg x 4)/0.5 mL Syrg  Generic drug:  flu vac [id number] 36mos up(PF)     PRENATAL COMPLETE ORAL  Take by mouth.     SYMBICORT 160-4.5 mcg/actuation Hfaa  Generic drug:  budesonide-formoterol 160-4.5 mcg  INHALE 2 PUFFS INTO THE LUNGS EVERY 12 HUORS          No discharge procedures on file.

## 2019-03-26 NOTE — PROGRESS NOTES
[facility]  Vaginal Delivery Progress Note  Obstetrics    SUBJECTIVE:     Yumiko Valerio is a 33 y.o. female PPD #1 status post Spontaneous vaginal delivery at 41w0d in a pregnancy complicated by N/A. Patient is doing well this morning. She denies nausea, vomiting, fever or chills. Patient reports mild abdominal pain that is well relieved by oral pain medications. Lochia is mild to moderate  and stable. Patient is voiding without difficulty and ambulating with no difficulty. She has passed flatus and has had a BM. Patient does plan to breast feed. N/A for contraception. She N/Adesires circumcision.    OBJECTIVE:     Vital Signs Ranges:  Temp:  [97.6 °F (36.4 °C)-98.4 °F (36.9 °C)] 97.6 °F (36.4 °C)  Pulse:  [70-74] 70  Resp:  [18] 18  SpO2:  [98 %-99 %] 98 %  BP: (117-127)/(57-73) 117/57    I/O (Last 24H):  No intake or output data in the 24 hours ending 03/26/19 1033    Physical Exam:  General:    alert, appears stated age and cooperative   Lungs:  normal effort   Heart:  normal apical pulse   Abdomen:  normal findings: soft, non-tender   Uterine Size:  firm located 1 FB below the umbilicus.   Incision:  N/A   Extremities:   wnl     [facility]   [facility]    ASSESSMENT:     Assessment:  Active Hospital Problems    Diagnosis    *Normal vaginal delivery    Thin meconium stained amniotic fluid    Full-term premature rupture of membranes with onset of labor more than 24 hours following rupture      PLAN:     Plan:  1. Postpartum care:   - Patient doing well. Continue routine management and advances.   - Continue PO pain meds. Pain well controlled.   - Encourage ambulation   - Circumcision N/A   - ContraceptionN/A    - Lactation breastfeeding    2. PP Day1 stable    Disposition: As patient meets milestones, will plan to discharge this afternoon.

## 2019-03-26 NOTE — L&D DELIVERY NOTE
[facility]  Vaginal Delivery   Operative Note    SUMMARY     Normal spontaneous vaginal delivery of live infant, was placed on mothers abdomen for skin to skin and bulb suctioning performed.  Infant delivered position OA over perineum.  Nuchal cord: No.    Spontaneous delivery of placenta and IV pitocin given noting uterine atony without bleeding, fundus noted to be above umbilicus and boggy, bimanual massage started, 800mcg rectal cytotec given and fundus eventually firm at U.   1st degree laceration noted and repaired with 3.0 vicryl suture under epidural and 10cc of local lidocaine also given due to some discomfort. Patient tolerated repair and hemostasis noted upon completion..  Patient tolerated delivery well. Sponge needle and lap counted correctly x2. EBL 400cc.     Indications: Normal vaginal delivery  Pregnancy complicated by:   Patient Active Problem List   Diagnosis    Extrinsic asthma without complication    Eczema, allergic    Normal vaginal delivery     Admitting GA: 41w0d    Delivery Information for  Kennedi Valerio    Birth information:  YOB: 2018   Time of birth: 10:14 PM   Sex: female   Head Delivery Date/Time: 3/24/2019 10:14 PM   Delivery type: Vaginal, Spontaneous   Gestational Age: 41w0d    Delivery Providers    Delivering clinician:  Nereyda Silva CNM   Provider Role    Jada Silva RN Delivery Nurse    Dinah Cristina RN Charge Nurse            Measurements    Weight:  3450 g  Length:           Apgars    Living status:  Living  Apgars:   1 min.:   5 min.:   10 min.:   15 min.:   20 min.:     Skin color:   1  1       Heart rate:   2  2       Reflex irritability:   2  2       Muscle tone:   2  2       Respiratory effort:   2  2       Total:   9  9       Apgars assigned by:  JOSEPH SILVA RN/ ISABEL LARSON RN         Operative Delivery    Forceps attempted?:  No  Vacuum extractor attempted?:  No         Shoulder Dystocia    Shoulder dystocia present?:  No            Presentation    Presentation:  Vertex  Position:  Middle Occiput Anterior           Interventions/Resuscitation    Method:  Bulb Suctioning, Tactile Stimulation, NICU Attended       Cord    Vessels:  3 vessels  Complications:  None  Delayed Cord Clamping?:  No  Cord Clamped Date/Time:  3/24/2019 10:16 PM  Cord Blood Disposition:  Sent with Baby  Gases Sent?:  No  Stem Cell Collection (by MD):  No       Placenta    Placenta delivery date/time:  3/24/2019 2223  Placenta removal:  Expressed  Placenta appearance:  Intact  Placenta disposition:  family           Labor Events:       labor: No     Labor Onset Date/Time:         Dilation Complete Date/Time: 2019 20:11     Start Pushing Date/Time: 2019 20:15     Rupture Date/Time:              Rupture type:           Fluid Amount: small      Fluid Color:        Fluid Odor:        Membrane Status (PeriCalm):        Rupture Date/Time (PeriCalm):        Fluid Amount (PeriCalm):        Fluid Color (PeriCalm):         steroids: None     Antibiotics given for GBS: No     Induction:       Indications for induction:        Augmentation: oxytocin     Indications for augmentation: Ineffective Contraction Pattern;Prolonged ROM     Labor complications: None     Additional complications:          Cervical ripening:                     Delivery:      Episiotomy: None     Indication for Episiotomy:       Perineal Lacerations: 1st Repaired:  Yes   Periurethral Laceration:   Repaired:     Labial Laceration:   Repaired:     Sulcus Laceration:   Repaired:     Vaginal Laceration:   Repaired:     Cervical Laceration:   Repaired:     Repair suture:       Repair # of packets: 1     Last Value - EBL - Nursing (mL): 400     Sum - EBL - Nursing (mL): 400     Last Value - EBL - Anesthesia (mL):      Calculated QBL (mL):        Vaginal Sweep Performed: No     Surgicount Correct: No       Other providers:       Anesthesia    Method:  Epidural, Local           Details (if applicable):  Trial of Labor      Categorization:      Priority:     Indications for :     Incision Type:       Additional  information:  Forceps:    Vacuum:    Breech:    Observed anomalies    Other (Comments): Patient presented PM 3/23 to [facility] with complaints of contractions and ROM (light meconium stained fluid) x 19hours.  Patient admitted at 1cm and augmented with pitocin.  Slow progression to eventual complete dilation at .  Pushed nearly 2hrs to s  pontaneously delivery live infant female in oa position, rotated to LOT position, anterior shoulder delivered easily with maternal push and mild traction followed by posterior shoulder, ESHA (patients partner) assisted with remainder of delivery and he  lped place infant on maternal abdomen.  Live infant female delivered at 2214, NICU in attendance and excused themselves from room once infant noted to be vigorous, apgars 9/9, cord clamped and cut once pulsation discontinued by Jean Carlos Valerio (patient).    Placenta mosqueda, intact, 3v cord, light mec stained, patient desires to keep.  Fundus massaged and boggy, 800 mcg of cytotec administered per rectum.  Bimanual massage to firm and at U. Perineum 1st degree laceration  Noted and repaired under epidur  al and local lidocaine (10cc of 1% lidocaine given due to complaints of pain). Hemostasis noted. EBL 400cc.  Patient and infant left in stable condition. Assume routine pp care after standard recovery. CBC in am.

## 2019-03-29 ENCOUNTER — TELEPHONE (OUTPATIENT)
Dept: LACTATION | Facility: CLINIC | Age: 34
End: 2019-03-29

## 2019-04-01 ENCOUNTER — TELEPHONE (OUTPATIENT)
Dept: OBSTETRICS AND GYNECOLOGY | Facility: OTHER | Age: 34
End: 2019-04-01

## 2019-04-01 ENCOUNTER — TELEPHONE (OUTPATIENT)
Dept: LACTATION | Facility: CLINIC | Age: 34
End: 2019-04-01

## 2019-04-01 NOTE — TELEPHONE ENCOUNTER
Patient doing well at home. Having some challenges with breastfeeding, had a call with lactation already and has an appointment with a consultant tomorrow at the breastfeeding center. Will call the clinic and schedule a postpartum follow up appt.

## 2019-04-01 NOTE — TELEPHONE ENCOUNTER
LC called to check on mom and baby. Mom states infant went to pediatrician today and weighed 7lbs 4oz, gaining 6 oz since last visit. Infant has had 6 wet diapers that are pale yellow and only 1 dirty diaper since midnight that was brownish/yellow. Educated mom that infant needs 3-4 yellow, watery seedy diapers to show she is in a gaining pattern. Mom states she has been pumping and bottle feeding infant 1 oz about every 3 hours. Sometimes she only nurses infant. Instructed pt infant needs more volume now that she is 8 days old. Infant should be taking 2 oz 8 or more times in 24 hours, pt verbalizes understanding. When pt pumps she get 1 oz total. She has supplemented with formula but only 2 1/2 oz total throughout the weekend. Educated pt that at this time she should be getting more volume. Pt asked about how to work her personal pump, motif duo, instructed to read package directions but explained that most pumps have 2 modes, let down and expression and LC and pt think she has been only using let down mode. Explained the differences and pt will make sure to use the expression mode. When asked pt if her breast feel full before feeding/pumping she isn't sure. Lc explained to pt that her breast should feel heavy, full, firm before emptying and softer after and pt states she does not think she feels that way. Reinforced to pt if she nurses infant she needs to make sure baby is actively sucking and not to let baby linger, verbalizes understanding. Instructed pt to nurse infant on cue, or every 3 hours, whichever comes 1st, for no more than 30 minutes total, then pump for 20-30 minutes and supplement infant with at least 2 oz or more if infant not content. Instructed pt her breasts need to be stimulated effectively 8 or more times in 24 hours in order for her body to make enough milk for her baby, verbalizes understanding. Mom/baby have an appointment at the [facility] for a pre/post feed weight  and a pediatrician visit on 4/4. Instructed pt Praise and support provided and instructed pt to call warmline as needed.

## 2019-04-15 ENCOUNTER — OFFICE VISIT (OUTPATIENT)
Dept: PRIMARY CARE CLINIC | Facility: CLINIC | Age: 34
End: 2019-04-15
Attending: FAMILY MEDICINE
Payer: COMMERCIAL

## 2019-04-15 ENCOUNTER — LAB VISIT (OUTPATIENT)
Dept: LAB | Facility: HOSPITAL | Age: 34
End: 2019-04-15
Attending: FAMILY MEDICINE
Payer: COMMERCIAL

## 2019-04-15 ENCOUNTER — POSTPARTUM VISIT (OUTPATIENT)
Dept: OBSTETRICS AND GYNECOLOGY | Facility: CLINIC | Age: 34
End: 2019-04-15
Payer: COMMERCIAL

## 2019-04-15 VITALS
WEIGHT: 253.5 LBS | DIASTOLIC BLOOD PRESSURE: 98 MMHG | BODY MASS INDEX: 38.42 KG/M2 | HEIGHT: 68 IN | SYSTOLIC BLOOD PRESSURE: 125 MMHG

## 2019-04-15 VITALS
WEIGHT: 252.88 LBS | DIASTOLIC BLOOD PRESSURE: 78 MMHG | BODY MASS INDEX: 38.32 KG/M2 | OXYGEN SATURATION: 98 % | SYSTOLIC BLOOD PRESSURE: 120 MMHG | HEIGHT: 68 IN | HEART RATE: 69 BPM

## 2019-04-15 DIAGNOSIS — J45.40 MODERATE PERSISTENT EXTRINSIC ASTHMA WITHOUT COMPLICATION: ICD-10-CM

## 2019-04-15 DIAGNOSIS — E55.9 VITAMIN D DEFICIENCY: Primary | ICD-10-CM

## 2019-04-15 DIAGNOSIS — L23.9 ECZEMA, ALLERGIC: ICD-10-CM

## 2019-04-15 DIAGNOSIS — J45.30 ASTHMA, ALLERGIC, MILD PERSISTENT, UNCOMPLICATED: ICD-10-CM

## 2019-04-15 DIAGNOSIS — E55.9 VITAMIN D DEFICIENCY: ICD-10-CM

## 2019-04-15 DIAGNOSIS — Z00.00 ANNUAL PHYSICAL EXAM: Primary | ICD-10-CM

## 2019-04-15 LAB — 25(OH)D3+25(OH)D2 SERPL-MCNC: 21 NG/ML (ref 30–96)

## 2019-04-15 PROCEDURE — 82306 VITAMIN D 25 HYDROXY: CPT

## 2019-04-15 PROCEDURE — 99999 PR PBB SHADOW E&M-EST. PATIENT-LVL III: CPT | Mod: PBBFAC,,, | Performed by: ADVANCED PRACTICE MIDWIFE

## 2019-04-15 PROCEDURE — 0503F POSTPARTUM CARE VISIT: CPT | Mod: S$GLB,,, | Performed by: ADVANCED PRACTICE MIDWIFE

## 2019-04-15 PROCEDURE — 99395 PR PREVENTIVE VISIT,EST,18-39: ICD-10-PCS | Mod: S$GLB,,, | Performed by: FAMILY MEDICINE

## 2019-04-15 PROCEDURE — 99395 PREV VISIT EST AGE 18-39: CPT | Mod: S$GLB,,, | Performed by: FAMILY MEDICINE

## 2019-04-15 PROCEDURE — 99999 PR PBB SHADOW E&M-EST. PATIENT-LVL III: ICD-10-PCS | Mod: PBBFAC,,, | Performed by: FAMILY MEDICINE

## 2019-04-15 PROCEDURE — 36415 COLL VENOUS BLD VENIPUNCTURE: CPT | Mod: PN

## 2019-04-15 PROCEDURE — 99999 PR PBB SHADOW E&M-EST. PATIENT-LVL III: ICD-10-PCS | Mod: PBBFAC,,, | Performed by: ADVANCED PRACTICE MIDWIFE

## 2019-04-15 PROCEDURE — 0503F PR POSTPARTUM CARE VISIT: ICD-10-PCS | Mod: S$GLB,,, | Performed by: ADVANCED PRACTICE MIDWIFE

## 2019-04-15 PROCEDURE — 99999 PR PBB SHADOW E&M-EST. PATIENT-LVL III: CPT | Mod: PBBFAC,,, | Performed by: FAMILY MEDICINE

## 2019-04-15 RX ORDER — BUDESONIDE AND FORMOTEROL FUMARATE DIHYDRATE 160; 4.5 UG/1; UG/1
AEROSOL RESPIRATORY (INHALATION)
Qty: 10.2 INHALER | Refills: 5 | Status: SHIPPED | OUTPATIENT
Start: 2019-04-15 | End: 2019-11-07 | Stop reason: SDUPTHER

## 2019-04-15 RX ORDER — ALBUTEROL SULFATE 90 UG/1
2 AEROSOL, METERED RESPIRATORY (INHALATION) EVERY 6 HOURS PRN
Qty: 18 G | Refills: 5 | Status: SHIPPED | OUTPATIENT
Start: 2019-04-15 | End: 2019-04-25 | Stop reason: SDUPTHER

## 2019-04-15 RX ORDER — BUPROPION HYDROCHLORIDE 300 MG/1
300 TABLET ORAL DAILY
Qty: 90 TABLET | Refills: 1 | Status: SHIPPED | OUTPATIENT
Start: 2019-04-15 | End: 2019-11-07 | Stop reason: SDUPTHER

## 2019-04-15 NOTE — PROGRESS NOTES
Yumiko Valerio is a 33 y.o.  is here for a postpartum visit.  Estimated Date of Delivery: 3/17/19     DELIVERY HISTORY   delivery on 3/24/2019 at 41 gestation, female infant, weight , 1st degree laceration with repair. Breast feeding infant. Brings Nirmala and partner ESHA with her to visit.     C/C: Postpartum exam. No complaints, history of vitamin D deficiency--requests order to redraw.    Pregnancy was c/b by:  No complications    HPI:  Doing doing well; ambulating and tolerating regular diet  Lochia: yellow alba   Vaginal pain: denies  Abdominal pain: denies, suspected diastasis  Breasts/nipples:  breastfeeding well without difficulty and infant is gaining appropriately per their pediatrician  Bowel/bladder function: normal, some increased pelvic pressure, occasional leakage of urine with cough or laugh.  Depression/anxiety: denies ; EPDS score: 6  Support at home: great  Contraception: considering denies/ homosexual relationship    Pap hx: normal less than one year    Past Medical History:   Diagnosis Date    Allergy     Anemia     Asthma     Depression      Past Surgical History:   Procedure Laterality Date    WISDOM TOOTH EXTRACTION       Review of patient's allergies indicates:   Allergen Reactions    Hay fever and allergy relief Shortness Of Breath     Watery and itchey eyes    Shellfish containing products Hives, Itching, Shortness Of Breath and Swelling    Iodine and iodide containing products        Current Outpatient Medications:     albuterol 90 mcg/actuation inhaler, Inhale 2 puffs into the lungs every 6 (six) hours as needed for Wheezing., Disp: 18 g, Rfl: 5    buPROPion (WELLBUTRIN XL) 300 MG 24 hr tablet, TAKE 1 TABLET BY MOUTH ONCE A DAY, Disp: 30 tablet, Rfl: 0    FLUZONE QUAD 2302-5383, PF, 60 mcg (15 mcg x 4)/0.5 mL Syrg, , Disp: , Rfl:     prenatal 21/iron fu/folic acid (PRENATAL COMPLETE ORAL), Take by mouth., Disp: , Rfl:     SYMBICORT 160-4.5 mcg/actuation HFAA, INHALE  "2 PUFFS INTO THE LUNGS EVERY 12 HUORS, Disp: 10.2 Inhaler, Rfl: 2    PE:  OB History    Para Term  AB Living   1 1 1 0 0 1   SAB TAB Ectopic Multiple Live Births   0 0 0 0 1      # Outcome Date GA Lbr Gaudencio/2nd Weight Sex Delivery Anes PTL Lv   1 Term 19 41w0d / 02:03 3.45 kg (7 lb 9.7 oz) F Vag-Spont EPI, Local N KRISSY      Vitals:    04/15/19 1022   BP: (!) 125/98     BP (!) 125/98 (BP Location: Right arm, Patient Position: Sitting, BP Method: Large (Manual))   Ht 5' 8" (1.727 m)   Wt 115 kg (253 lb 8.5 oz)   LMP 2018   Breastfeeding? Yes   BMI 38.55 kg/m²   Gen: A&O x 4, NAD  Neck: non-tender, not enlarged, no masses or nodules  CV: normal HR  Lungs: normal resp effort  Breasts: lactating bilaterally, no masses, non-tender, nipples intact  Abdomen: soft, nontender, and nondistended, + diastasis recti   Vulva/Vagina: healed well, approximated, non-tender.    Speculum: cervix appears closed, no lesions, no discharge  Bimanual: uterus involuted, non-tender, neg CMT, adnexa free bilaterally  Pap Smear today? no    ASSESSMENT/PLAN:  Postpartum exam s/p  delivery  -- Counseling regarding resuming normal activities of exercise and work.  -- Postpartum precautions reviewed  -- Reviewed pap guidelines. Pap due no later than 2021  Continue annual exams; return then or sooner if any symptoms of pp depression or any other problem.    History of Vitamin D Deficiency: drawn today, f/u with lab results via [facility]     Birth control counseling  --- declines (in same sex relationship and breastfeeding, discussed some benefits of Mirena IUD in regards to menses--will notify office if any interest)    Breast Feeding  -- Continue PNV while breastfeeding.  --- Resources discussed    Pelvic Floor dysfunction (pelvic pressure/ mild leakage of urine), diastasis recti  -patient referred to pelvic floor PT ([facility]ourke at [facility], contact info given)  -Patient had pregnancy exposure to PT " via her , very interested in PT f/u postpartum, agreed and referral encouraged.         UPDATED MED LIST:  Current Outpatient Medications   Medication Sig Dispense Refill    albuterol 90 mcg/actuation inhaler Inhale 2 puffs into the lungs every 6 (six) hours as needed for Wheezing. 18 g 5    buPROPion (WELLBUTRIN XL) 300 MG 24 hr tablet TAKE 1 TABLET BY MOUTH ONCE A DAY 30 tablet 0    FLUZONE QUAD 4766-6239, PF, 60 mcg (15 mcg x 4)/0.5 mL Syrg       prenatal 21/iron fu/folic acid (PRENATAL COMPLETE ORAL) Take by mouth.      SYMBICORT 160-4.5 mcg/actuation HFAA INHALE 2 PUFFS INTO THE LUNGS EVERY 12 HUORS 10.2 Inhaler 2     No current facility-administered medications for this visit.      Nereyda Silva CNM, MSN  4/15/984918:25 AM

## 2019-04-15 NOTE — PROGRESS NOTES
Subjective:       Patient ID: Yumiko Valerio is a 33 y.o. female.    Chief Complaint: Follow-up (medication refill)    Pt seen today for annual exam. ESHA her partner in room as well as new baby girl 3 weeks old-[facility].    Pt is also seen by GYN here     Per pt, she is an asthmatic and has had to rely on her rescue inhaler multiple times per day these past few months.  needs RF of her baseline and rescue inhs now.     Pt also with extensive h/o Major Depression with hospitalization in past. Pt states that she needs to be seen for counseling and is here with me to bridge over to a psych. Prefers that all of her treating physicians are of those of color. Per pt, quitting work alleviated lot of stress as well as trying to go to school.Has been in past on various meds but didn't feel that most worked for her and effexor xr was the worst per pt, leaving her with detachment. States that wellbutrin works well and cony after postpartum is doing well with depression controlled                Review of Systems   Constitutional: Positive for activity change. Negative for appetite change, chills, fatigue and fever.   HENT: Negative for congestion, ear pain, sinus pressure, sore throat and trouble swallowing.    Eyes: Negative for photophobia, pain and visual disturbance.   Respiratory: Negative for apnea, cough, chest tightness, shortness of breath and wheezing.    Cardiovascular: Negative for chest pain, palpitations and leg swelling.   Gastrointestinal: Negative for abdominal distention, abdominal pain, constipation, diarrhea, nausea and vomiting.   Genitourinary: Negative.    Musculoskeletal: Negative for back pain, joint swelling and neck pain.   Skin: Negative.    Neurological: Negative for dizziness, tremors, weakness, numbness and headaches.   Psychiatric/Behavioral: Negative for behavioral problems, decreased concentration, dysphoric mood, self-injury, sleep disturbance and suicidal ideas. The patient is not  nervous/anxious.    All other systems reviewed and are negative.      Objective:      Physical Exam   Constitutional: She is oriented to person, place, and time. She appears well-developed and well-nourished.   HENT:   Head: Normocephalic and atraumatic.   Right Ear: External ear normal.   Left Ear: External ear normal.   Nose: Nose normal.   Mouth/Throat: Oropharynx is clear and moist. No oropharyngeal exudate.   Eyes: Pupils are equal, round, and reactive to light. EOM are normal.   Neck: Normal range of motion. Neck supple. No thyromegaly present.   Cardiovascular: Normal rate, regular rhythm, normal heart sounds and intact distal pulses.   No murmur heard.  Pulmonary/Chest: Effort normal. No respiratory distress. She has no wheezes.   Abdominal: Soft. Bowel sounds are normal. She exhibits no distension and no mass. There is no tenderness. There is no rebound and no guarding.   Musculoskeletal: Normal range of motion. She exhibits no edema or tenderness.   Lymphadenopathy:     She has no cervical adenopathy.   Neurological: She is alert and oriented to person, place, and time. She has normal reflexes. She displays normal reflexes. No cranial nerve deficit. She exhibits normal muscle tone. Coordination normal.   Skin: Skin is warm and dry. No erythema.   Psychiatric: She has a normal mood and affect. Her behavior is normal. Judgment and thought content normal.       Assessment:       1. Asthma, allergic, mild persistent, uncomplicated        Plan:       Asthma, allergic, mild persistent, uncomplicated  -     albuterol (PROVENTIL/VENTOLIN HFA) 90 mcg/actuation inhaler; Inhale 2 puffs into the lungs every 6 (six) hours as needed for Wheezing.  Dispense: 18 g; Refill: 5    Other orders  -     buPROPion (WELLBUTRIN XL) 300 MG 24 hr tablet; Take 1 tablet (300 mg total) by mouth once daily.  Dispense: 90 tablet; Refill: 1  -     budesonide-formoterol 160-4.5 mcg (SYMBICORT) 160-4.5 mcg/actuation HFAA; INHALE 2 PUFFS  INTO THE LUNGS EVERY 12 HUORS  Dispense: 10.2 Inhaler; Refill: 5    PE wnl. Labs are UTD done during pregnancy.  Wellbutrin working well.   Asthma controlled when having all of her inhalers    RTc prn or annually

## 2019-04-19 DIAGNOSIS — E55.9 VITAMIN D DEFICIENCY: Primary | ICD-10-CM

## 2019-04-22 DIAGNOSIS — E55.9 VITAMIN D INSUFFICIENCY: Primary | ICD-10-CM

## 2019-04-25 DIAGNOSIS — J45.30 ASTHMA, ALLERGIC, MILD PERSISTENT, UNCOMPLICATED: ICD-10-CM

## 2019-04-25 RX ORDER — ALBUTEROL SULFATE 90 UG/1
2 AEROSOL, METERED RESPIRATORY (INHALATION) EVERY 6 HOURS PRN
Qty: 18 G | Refills: 5 | Status: SHIPPED | OUTPATIENT
Start: 2019-04-25 | End: 2019-11-07 | Stop reason: SDUPTHER

## 2019-04-25 NOTE — TELEPHONE ENCOUNTER
Pt requesting refill on Ventolin Albuterol Inhaler. Medication pended. Please authorize.  Lov: 4/15/2019.  Charis Ayoub LPN

## 2019-05-10 ENCOUNTER — PATIENT MESSAGE (OUTPATIENT)
Dept: OBSTETRICS AND GYNECOLOGY | Facility: CLINIC | Age: 34
End: 2019-05-10

## 2019-09-18 ENCOUNTER — OFFICE VISIT (OUTPATIENT)
Dept: PRIMARY CARE CLINIC | Facility: CLINIC | Age: 34
End: 2019-09-18
Attending: FAMILY MEDICINE
Payer: COMMERCIAL

## 2019-09-18 VITALS
WEIGHT: 251.63 LBS | BODY MASS INDEX: 38.14 KG/M2 | HEIGHT: 68 IN | HEART RATE: 84 BPM | SYSTOLIC BLOOD PRESSURE: 110 MMHG | DIASTOLIC BLOOD PRESSURE: 68 MMHG

## 2019-09-18 DIAGNOSIS — L30.8 OTHER ECZEMA: Primary | ICD-10-CM

## 2019-09-18 PROCEDURE — 90686 IIV4 VACC NO PRSV 0.5 ML IM: CPT | Mod: S$GLB,,, | Performed by: FAMILY MEDICINE

## 2019-09-18 PROCEDURE — 3008F BODY MASS INDEX DOCD: CPT | Mod: CPTII,S$GLB,, | Performed by: FAMILY MEDICINE

## 2019-09-18 PROCEDURE — 90686 FLU VACCINE (QUAD) GREATER THAN OR EQUAL TO 3YO PRESERVATIVE FREE IM: ICD-10-PCS | Mod: S$GLB,,, | Performed by: FAMILY MEDICINE

## 2019-09-18 PROCEDURE — 99999 PR PBB SHADOW E&M-EST. PATIENT-LVL III: ICD-10-PCS | Mod: PBBFAC,,, | Performed by: FAMILY MEDICINE

## 2019-09-18 PROCEDURE — 3008F PR BODY MASS INDEX (BMI) DOCUMENTED: ICD-10-PCS | Mod: CPTII,S$GLB,, | Performed by: FAMILY MEDICINE

## 2019-09-18 PROCEDURE — 90471 IMMUNIZATION ADMIN: CPT | Mod: S$GLB,,, | Performed by: FAMILY MEDICINE

## 2019-09-18 PROCEDURE — 90471 FLU VACCINE (QUAD) GREATER THAN OR EQUAL TO 3YO PRESERVATIVE FREE IM: ICD-10-PCS | Mod: S$GLB,,, | Performed by: FAMILY MEDICINE

## 2019-09-18 PROCEDURE — 99213 OFFICE O/P EST LOW 20 MIN: CPT | Mod: 25,S$GLB,, | Performed by: FAMILY MEDICINE

## 2019-09-18 PROCEDURE — 99999 PR PBB SHADOW E&M-EST. PATIENT-LVL III: CPT | Mod: PBBFAC,,, | Performed by: FAMILY MEDICINE

## 2019-09-18 PROCEDURE — 99213 PR OFFICE/OUTPT VISIT, EST, LEVL III, 20-29 MIN: ICD-10-PCS | Mod: 25,S$GLB,, | Performed by: FAMILY MEDICINE

## 2019-09-18 RX ORDER — BETAMETHASONE VALERATE 1.2 MG/G
OINTMENT TOPICAL 2 TIMES DAILY
Qty: 45 G | Refills: 3 | Status: SHIPPED | OUTPATIENT
Start: 2019-09-18

## 2019-09-18 NOTE — PROGRESS NOTES
"Patient was given vaccine information sheet for the Flu Vaccine. The area of injection was palpated using the acromion process as a landmark. This area was cleaned with alcohol. Using a 25g 1" safety needle, 0.5mL of the vaccine was placed into the right muscle. The injection site was dressed with a bandage. Patient experienced no complications and was discharged in stable condition. Fluarix vaccine Lot: [id number] Exp: 06/30/2020.  Charis Ayoub LPN      "

## 2019-09-18 NOTE — PROGRESS NOTES
Subjective:       Patient ID: Yumiko Valerio is a 34 y.o. female.    Chief Complaint: Eczema and Flu Vaccine    Pt presents today for rash on neck that she thinks is her eczema along with a nickel sensitivity from her jewelry. Pt does feel well otherwise.     Pt also states that she and her partner (also my patient) will be moving to [facility] for his job. Pt is sad to leave [facility] where her family is from. Also has 5 mos old and scared to move with her to a new place        Review of Systems   Constitutional: Negative.    Eyes: Negative.    Respiratory: Negative for cough, chest tightness and shortness of breath.    Cardiovascular: Negative for chest pain, palpitations and leg swelling.   Gastrointestinal: Negative.    Musculoskeletal: Negative.  Negative for joint swelling.   Skin: Positive for rash. Negative for color change, pallor and wound.   Neurological: Negative for dizziness, weakness, light-headedness and headaches.   All other systems reviewed and are negative.      Objective:      Physical Exam   Constitutional: She is oriented to person, place, and time. She appears well-developed and well-nourished.   HENT:   Head: Normocephalic and atraumatic.   Eyes: Pupils are equal, round, and reactive to light. Conjunctivae and EOM are normal.   Neck: Normal range of motion. Neck supple. No thyromegaly present.   Cardiovascular: Normal rate, regular rhythm, normal heart sounds and intact distal pulses.   No murmur heard.  Pulmonary/Chest: Effort normal and breath sounds normal. No respiratory distress. She has no wheezes. She has no rales. She exhibits no tenderness.   Musculoskeletal: Normal range of motion. She exhibits no edema.   Lymphadenopathy:     She has no cervical adenopathy.   Neurological: She is alert and oriented to person, place, and time.   Skin: Skin is warm. Rash (pos scaly m/p rash located on b/l sides of her neck. confluent and ss of xerosis noted) noted. No erythema.   Psychiatric: She has a normal  mood and affect. Her behavior is normal. Judgment and thought content normal.       Assessment:       1. Other eczema        Plan:       Lukewarm baths, avoid triggering factors, mild soaps and detergents. Trial of betamethasone for 10 days. SE of ointment d/w pt  Other eczema  -     betamethasone valerate 0.1% (VALISONE) 0.1 % Oint; Apply topically 2 (two) times daily. X 10 days  Dispense: 45 g; Refill: 3    Other orders  -     Cancel: Influenza - High Dose (65+) (PF) (IM)  -     Influenza - Quadrivalent (PF)    RTC prn

## 2019-11-07 DIAGNOSIS — F32.A DEPRESSION, UNSPECIFIED DEPRESSION TYPE: ICD-10-CM

## 2019-11-07 DIAGNOSIS — J45.30 ASTHMA, ALLERGIC, MILD PERSISTENT, UNCOMPLICATED: ICD-10-CM

## 2019-11-07 DIAGNOSIS — J45.909 EXTRINSIC ASTHMA WITHOUT COMPLICATION, UNSPECIFIED ASTHMA SEVERITY, UNSPECIFIED WHETHER PERSISTENT: Primary | ICD-10-CM

## 2019-11-07 RX ORDER — ALBUTEROL SULFATE 90 UG/1
2 AEROSOL, METERED RESPIRATORY (INHALATION) EVERY 6 HOURS PRN
Qty: 18 G | Refills: 5 | Status: SHIPPED | OUTPATIENT
Start: 2019-11-07 | End: 2020-03-20 | Stop reason: SDUPTHER

## 2019-11-07 RX ORDER — BUDESONIDE AND FORMOTEROL FUMARATE DIHYDRATE 160; 4.5 UG/1; UG/1
AEROSOL RESPIRATORY (INHALATION)
Qty: 10.2 INHALER | Refills: 5 | Status: SHIPPED | OUTPATIENT
Start: 2019-11-07

## 2019-11-07 RX ORDER — BUPROPION HYDROCHLORIDE 300 MG/1
300 TABLET ORAL DAILY
Qty: 90 TABLET | Refills: 1 | Status: SHIPPED | OUTPATIENT
Start: 2019-11-07 | End: 2020-03-20 | Stop reason: SDUPTHER

## 2020-03-20 DIAGNOSIS — F32.A DEPRESSION, UNSPECIFIED DEPRESSION TYPE: ICD-10-CM

## 2020-03-20 DIAGNOSIS — J45.30 ASTHMA, ALLERGIC, MILD PERSISTENT, UNCOMPLICATED: ICD-10-CM

## 2020-03-20 RX ORDER — ALBUTEROL SULFATE 90 UG/1
2 AEROSOL, METERED RESPIRATORY (INHALATION) EVERY 6 HOURS PRN
Qty: 18 G | Refills: 5 | Status: SHIPPED | OUTPATIENT
Start: 2020-03-20

## 2020-03-20 RX ORDER — BUPROPION HYDROCHLORIDE 300 MG/1
300 TABLET ORAL DAILY
Qty: 90 TABLET | Refills: 1 | Status: SHIPPED | OUTPATIENT
Start: 2020-03-20 | End: 2020-09-29

## 2020-09-29 DIAGNOSIS — F32.A DEPRESSION, UNSPECIFIED DEPRESSION TYPE: ICD-10-CM

## 2020-09-29 RX ORDER — BUPROPION HYDROCHLORIDE 300 MG/1
TABLET ORAL
Qty: 30 TABLET | Refills: 0 | Status: SHIPPED | OUTPATIENT
Start: 2020-09-29 | End: 2020-10-22

## 2020-09-30 NOTE — TELEPHONE ENCOUNTER
Pt states that she moved to [place] and is having a hard time finding a new provider. Informed pt that a 30 day supply was sent in and that she will need to follow up with a provider for future refills on the Wellbutrin. Pt verbalized understanding.

## 2021-04-16 ENCOUNTER — PATIENT MESSAGE (OUTPATIENT)
Dept: RESEARCH | Facility: HOSPITAL | Age: 36
End: 2021-04-16

## 2023-12-01 NOTE — TELEPHONE ENCOUNTER
----- Message from Jazz Mireles sent at 4/25/2019  9:14 AM CDT -----  Contact: pt  Can the clinic reply in [facility]: no    Please refill the medication(s) listed below. The patient can be reached at this phone number ([phone]_) once it is called into the pharmacy.    Medication #1 albuterol (PROVENTIL/VENTOLIN HFA) 90 mcg/actuation inhaler    [id number] [address]   Continue home trazodone